# Patient Record
Sex: FEMALE | Race: WHITE | NOT HISPANIC OR LATINO | Employment: PART TIME
[De-identification: names, ages, dates, MRNs, and addresses within clinical notes are randomized per-mention and may not be internally consistent; named-entity substitution may affect disease eponyms.]

---

## 2017-01-11 ENCOUNTER — RECORDS - HEALTHEAST (OUTPATIENT)
Dept: ADMINISTRATIVE | Facility: OTHER | Age: 27
End: 2017-01-11

## 2017-01-11 LAB
HPV_EXT - HISTORICAL: NORMAL
PAP SMEAR - HIM PATIENT REPORTED: NORMAL

## 2018-04-17 ENCOUNTER — PRENATAL OFFICE VISIT - HEALTHEAST (OUTPATIENT)
Dept: MIDWIFE SERVICES | Facility: CLINIC | Age: 28
End: 2018-04-17

## 2018-04-17 DIAGNOSIS — Z98.891 HX OF CESAREAN SECTION: ICD-10-CM

## 2018-04-17 DIAGNOSIS — Z87.09 HISTORY OF ASTHMA: ICD-10-CM

## 2018-04-17 DIAGNOSIS — Z34.80 ENCOUNTER FOR SUPERVISION OF OTHER NORMAL PREGNANCY: ICD-10-CM

## 2018-04-17 LAB
BASOPHILS # BLD AUTO: 0.1 THOU/UL (ref 0–0.2)
BASOPHILS NFR BLD AUTO: 1 % (ref 0–2)
CLUE CELLS: NORMAL
EOSINOPHIL # BLD AUTO: 0.1 THOU/UL (ref 0–0.4)
EOSINOPHIL NFR BLD AUTO: 1 % (ref 0–6)
ERYTHROCYTE [DISTWIDTH] IN BLOOD BY AUTOMATED COUNT: 13.3 % (ref 11–14.5)
HCT VFR BLD AUTO: 37.7 % (ref 35–47)
HGB BLD-MCNC: 11.9 G/DL (ref 12–16)
HIV 1+2 AB+HIV1 P24 AG SERPL QL IA: NEGATIVE
LYMPHOCYTES # BLD AUTO: 2.3 THOU/UL (ref 0.8–4.4)
LYMPHOCYTES NFR BLD AUTO: 25 % (ref 20–40)
MCH RBC QN AUTO: 28.9 PG (ref 27–34)
MCHC RBC AUTO-ENTMCNC: 31.6 G/DL (ref 32–36)
MCV RBC AUTO: 92 FL (ref 80–100)
MONOCYTES # BLD AUTO: 0.7 THOU/UL (ref 0–0.9)
MONOCYTES NFR BLD AUTO: 7 % (ref 2–10)
NEUTROPHILS # BLD AUTO: 6.1 THOU/UL (ref 2–7.7)
NEUTROPHILS NFR BLD AUTO: 66 % (ref 50–70)
PLATELET # BLD AUTO: 374 THOU/UL (ref 140–440)
PMV BLD AUTO: 11.2 FL (ref 8.5–12.5)
RBC # BLD AUTO: 4.12 MILL/UL (ref 3.8–5.4)
TRICHOMONAS, WET PREP: NORMAL
WBC: 9.2 THOU/UL (ref 4–11)
YEAST, WET PREP: NORMAL

## 2018-04-17 ASSESSMENT — MIFFLIN-ST. JEOR: SCORE: 1362.25

## 2018-04-18 LAB
25(OH)D3 SERPL-MCNC: 32.8 NG/ML (ref 30–80)
ABO/RH(D): NORMAL
ABORH REPEAT: NORMAL
ANTIBODY SCREEN: NEGATIVE
BACTERIA SPEC CULT: NO GROWTH
C TRACH DNA SPEC QL PROBE+SIG AMP: NEGATIVE
HBV SURFACE AG SERPL QL IA: NEGATIVE
N GONORRHOEA DNA SPEC QL NAA+PROBE: NEGATIVE
RUBV IGG SERPL QL IA: POSITIVE
T PALLIDUM AB SER QL: NEGATIVE

## 2018-04-19 ENCOUNTER — AMBULATORY - HEALTHEAST (OUTPATIENT)
Dept: MIDWIFE SERVICES | Facility: CLINIC | Age: 28
End: 2018-04-19

## 2018-04-19 ENCOUNTER — COMMUNICATION - HEALTHEAST (OUTPATIENT)
Dept: ADMINISTRATIVE | Facility: CLINIC | Age: 28
End: 2018-04-19

## 2018-04-19 DIAGNOSIS — Z98.891 HX OF CESAREAN SECTION: ICD-10-CM

## 2018-04-19 DIAGNOSIS — Z87.09 HISTORY OF ASTHMA: ICD-10-CM

## 2018-04-20 ENCOUNTER — AMBULATORY - HEALTHEAST (OUTPATIENT)
Dept: MIDWIFE SERVICES | Facility: CLINIC | Age: 28
End: 2018-04-20

## 2018-04-20 ENCOUNTER — HOSPITAL ENCOUNTER (OUTPATIENT)
Dept: ULTRASOUND IMAGING | Facility: CLINIC | Age: 28
Discharge: HOME | End: 2018-04-20
Attending: MIDWIFE

## 2018-04-20 DIAGNOSIS — Z34.80 ENCOUNTER FOR SUPERVISION OF OTHER NORMAL PREGNANCY: ICD-10-CM

## 2018-04-20 DIAGNOSIS — Z87.09 HISTORY OF ASTHMA: ICD-10-CM

## 2018-04-20 DIAGNOSIS — Z98.891 HX OF CESAREAN SECTION: ICD-10-CM

## 2018-05-29 ENCOUNTER — RECORDS - HEALTHEAST (OUTPATIENT)
Dept: ADMINISTRATIVE | Facility: OTHER | Age: 28
End: 2018-05-29

## 2018-05-29 ENCOUNTER — PRENATAL OFFICE VISIT - HEALTHEAST (OUTPATIENT)
Dept: MIDWIFE SERVICES | Facility: CLINIC | Age: 28
End: 2018-05-29

## 2018-05-29 DIAGNOSIS — Z87.09 HISTORY OF ASTHMA: ICD-10-CM

## 2018-05-29 DIAGNOSIS — Z13.79 GENETIC SCREENING: ICD-10-CM

## 2018-05-29 DIAGNOSIS — Z71.1 CONCERN ABOUT INFECTIOUS DISEASE WITHOUT DIAGNOSIS: ICD-10-CM

## 2018-05-29 DIAGNOSIS — Z34.82 ENCOUNTER FOR SUPERVISION OF OTHER NORMAL PREGNANCY IN SECOND TRIMESTER: ICD-10-CM

## 2018-05-29 DIAGNOSIS — Z98.891 HX OF CESAREAN SECTION: ICD-10-CM

## 2018-05-29 ASSESSMENT — MIFFLIN-ST. JEOR: SCORE: 1375.86

## 2018-05-30 ENCOUNTER — COMMUNICATION - HEALTHEAST (OUTPATIENT)
Dept: ADMINISTRATIVE | Facility: CLINIC | Age: 28
End: 2018-05-30

## 2018-05-31 LAB
AFP MOM: 0.96 MOM
ALPHA FETO PROTEIN: 33.8 NG/ML
CALCULATED AGE AT EDD: NORMAL
COLLECTION DATE: NORMAL
CURRENT CIGARETTE SMOKING STATUS: NORMAL
DOWN SYNDROME MATERNAL AGE RISK: NORMAL
DOWN SYNDROME SCREEN RISK ESTIMATE: NORMAL
EDD BY LMP: NORMAL
GA ON COLLECTION BY DATES: NORMAL WK,D
GA USED IN RISK ESTIMATE: NORMAL
GENERAL TEST INFORMATION: NORMAL
HCG, TOTAL MOM: 0.56 MOM
HCG, TOTAL: 19.9 IU/ML
INHIBIN MOM: 0.72 MOM
INHIBIN: 114 PG/ML
INITIAL OR REPEAT TESTING: NORMAL
INSULIN DIABETIC: NO
INTERPRETATION: NORMAL
IVF PREGNANCY: NO
Lab: NORMAL
NEURAL TUBE DEFECT RISK ESTIMATE: NORMAL
NUMBER OF CHORIONS: NORMAL
NUMBER OF FETUSES:: 1
PATIENT OR FATHER OF BABY HAS A NTD: NO
PATIENT WEIGHT: 142 LBS
PHYSICIAN PHONE NUMBER: NORMAL
PREV DOWN (T21) / TRISOMY PREGNANCY: NORMAL
PREV PREGNANCY W/ NEURAL TUBE DEFECT: NO
PT DATE OF BIRTH: NORMAL
RACE OF MOTHER: NORMAL
RECOMMENDED FOLLOW UP: NORMAL
TRISOMY 18 SCREEN RISK ESTIMATE: NORMAL
UE3 MOM: 1.16 MOM
UE3: 0.96 NG/ML

## 2018-06-15 ENCOUNTER — COMMUNICATION - HEALTHEAST (OUTPATIENT)
Dept: ADMINISTRATIVE | Facility: CLINIC | Age: 28
End: 2018-06-15

## 2018-06-26 ENCOUNTER — PRENATAL OFFICE VISIT - HEALTHEAST (OUTPATIENT)
Dept: MIDWIFE SERVICES | Facility: CLINIC | Age: 28
End: 2018-06-26

## 2018-06-26 ENCOUNTER — HOSPITAL ENCOUNTER (OUTPATIENT)
Dept: ULTRASOUND IMAGING | Facility: CLINIC | Age: 28
Discharge: HOME | End: 2018-06-26
Attending: ADVANCED PRACTICE MIDWIFE

## 2018-06-26 DIAGNOSIS — Z34.82 ENCOUNTER FOR SUPERVISION OF OTHER NORMAL PREGNANCY IN SECOND TRIMESTER: ICD-10-CM

## 2018-06-26 DIAGNOSIS — Z13.79 GENETIC SCREENING: ICD-10-CM

## 2018-06-26 DIAGNOSIS — Z98.891 HX OF CESAREAN SECTION: ICD-10-CM

## 2018-06-26 ASSESSMENT — MIFFLIN-ST. JEOR: SCORE: 1403.07

## 2018-07-26 ENCOUNTER — RECORDS - HEALTHEAST (OUTPATIENT)
Dept: ADMINISTRATIVE | Facility: OTHER | Age: 28
End: 2018-07-26

## 2018-07-26 ENCOUNTER — PRENATAL OFFICE VISIT - HEALTHEAST (OUTPATIENT)
Dept: MIDWIFE SERVICES | Facility: CLINIC | Age: 28
End: 2018-07-26

## 2018-07-26 ENCOUNTER — OFFICE VISIT - HEALTHEAST (OUTPATIENT)
Dept: OBGYN | Facility: CLINIC | Age: 28
End: 2018-07-26

## 2018-07-26 DIAGNOSIS — Z98.891 HX OF CESAREAN SECTION: ICD-10-CM

## 2018-07-26 DIAGNOSIS — Z34.82 ENCOUNTER FOR SUPERVISION OF OTHER NORMAL PREGNANCY IN SECOND TRIMESTER: ICD-10-CM

## 2018-07-26 DIAGNOSIS — Z30.09 STERILIZATION CONSULT: ICD-10-CM

## 2018-07-26 DIAGNOSIS — O34.219 H/O CESAREAN SECTION COMPLICATING PREGNANCY: ICD-10-CM

## 2018-07-26 ASSESSMENT — MIFFLIN-ST. JEOR
SCORE: 1434.83
SCORE: 1434.83

## 2018-08-21 ENCOUNTER — PRENATAL OFFICE VISIT - HEALTHEAST (OUTPATIENT)
Dept: MIDWIFE SERVICES | Facility: CLINIC | Age: 28
End: 2018-08-21

## 2018-08-21 DIAGNOSIS — O26.899 PELVIC PAIN AFFECTING PREGNANCY: ICD-10-CM

## 2018-08-21 DIAGNOSIS — R10.2 PELVIC PAIN AFFECTING PREGNANCY: ICD-10-CM

## 2018-08-21 DIAGNOSIS — Z98.891 HX OF CESAREAN SECTION: ICD-10-CM

## 2018-08-21 DIAGNOSIS — Z34.80 ENCOUNTER FOR SUPERVISION OF OTHER NORMAL PREGNANCY: ICD-10-CM

## 2018-08-21 LAB
FASTING STATUS PATIENT QL REPORTED: NO
FIBRONECTIN FETAL VAG QL: NEGATIVE
GLUCOSE 1H P 50 G GLC PO SERPL-MCNC: 95 MG/DL (ref 70–139)
HGB BLD-MCNC: 10 G/DL (ref 12–16)

## 2018-08-21 ASSESSMENT — MIFFLIN-ST. JEOR: SCORE: 1448.43

## 2018-09-19 ENCOUNTER — PRENATAL OFFICE VISIT - HEALTHEAST (OUTPATIENT)
Dept: MIDWIFE SERVICES | Facility: CLINIC | Age: 28
End: 2018-09-19

## 2018-09-19 DIAGNOSIS — O09.93 SUPERVISION OF HIGH RISK PREGNANCY IN THIRD TRIMESTER: ICD-10-CM

## 2018-09-19 DIAGNOSIS — O26.899 ABDOMINAL CRAMPING AFFECTING PREGNANCY: ICD-10-CM

## 2018-09-19 DIAGNOSIS — Z98.891 HX OF CESAREAN SECTION: ICD-10-CM

## 2018-09-19 DIAGNOSIS — Z71.1 CONCERN ABOUT INFECTIOUS DISEASE WITHOUT DIAGNOSIS: ICD-10-CM

## 2018-09-19 DIAGNOSIS — B37.31 YEAST VAGINITIS: ICD-10-CM

## 2018-09-19 DIAGNOSIS — R10.9 ABDOMINAL CRAMPING AFFECTING PREGNANCY: ICD-10-CM

## 2018-09-19 DIAGNOSIS — Z13.79 GENETIC SCREENING: ICD-10-CM

## 2018-09-19 LAB
CLUE CELLS: ABNORMAL
TRICHOMONAS, WET PREP: ABNORMAL
YEAST, WET PREP: ABNORMAL

## 2018-09-19 ASSESSMENT — MIFFLIN-ST. JEOR: SCORE: 1466.58

## 2018-10-03 ENCOUNTER — PRENATAL OFFICE VISIT - HEALTHEAST (OUTPATIENT)
Dept: MIDWIFE SERVICES | Facility: CLINIC | Age: 28
End: 2018-10-03

## 2018-10-03 DIAGNOSIS — O09.93 SUPERVISION OF HIGH RISK PREGNANCY IN THIRD TRIMESTER: ICD-10-CM

## 2018-10-03 LAB — RUPTURE OF FETAL MEMBRANES BY ROM PLUS: NEGATIVE

## 2018-10-03 ASSESSMENT — MIFFLIN-ST. JEOR: SCORE: 1471.11

## 2018-10-12 ENCOUNTER — COMMUNICATION - HEALTHEAST (OUTPATIENT)
Dept: OBGYN | Facility: CLINIC | Age: 28
End: 2018-10-12

## 2018-10-17 ENCOUNTER — PRENATAL OFFICE VISIT - HEALTHEAST (OUTPATIENT)
Dept: MIDWIFE SERVICES | Facility: CLINIC | Age: 28
End: 2018-10-17

## 2018-10-17 DIAGNOSIS — Z98.891 HX OF CESAREAN SECTION: ICD-10-CM

## 2018-10-17 DIAGNOSIS — O09.93 SUPERVISION OF HIGH RISK PREGNANCY IN THIRD TRIMESTER: ICD-10-CM

## 2018-10-17 LAB — HGB BLD-MCNC: 11.2 G/DL (ref 12–16)

## 2018-10-17 ASSESSMENT — MIFFLIN-ST. JEOR: SCORE: 1489.26

## 2018-10-18 ENCOUNTER — OFFICE VISIT - HEALTHEAST (OUTPATIENT)
Dept: OBGYN | Facility: CLINIC | Age: 28
End: 2018-10-18

## 2018-10-18 DIAGNOSIS — O34.219 H/O CESAREAN SECTION COMPLICATING PREGNANCY: ICD-10-CM

## 2018-10-18 LAB
ALLERGIC TO PENICILLIN: NO
GP B STREP DNA SPEC QL NAA+PROBE: NEGATIVE

## 2018-10-18 ASSESSMENT — MIFFLIN-ST. JEOR: SCORE: 1489.26

## 2018-10-24 ENCOUNTER — PRENATAL OFFICE VISIT - HEALTHEAST (OUTPATIENT)
Dept: MIDWIFE SERVICES | Facility: CLINIC | Age: 28
End: 2018-10-24

## 2018-10-24 DIAGNOSIS — O09.93 SUPERVISION OF HIGH RISK PREGNANCY IN THIRD TRIMESTER: ICD-10-CM

## 2018-10-24 DIAGNOSIS — Z98.891 HX OF CESAREAN SECTION: ICD-10-CM

## 2018-10-24 ASSESSMENT — MIFFLIN-ST. JEOR: SCORE: 1484.72

## 2018-10-29 ENCOUNTER — RECORDS - HEALTHEAST (OUTPATIENT)
Dept: ADMINISTRATIVE | Facility: OTHER | Age: 28
End: 2018-10-29

## 2018-10-31 ENCOUNTER — PRENATAL OFFICE VISIT - HEALTHEAST (OUTPATIENT)
Dept: MIDWIFE SERVICES | Facility: CLINIC | Age: 28
End: 2018-10-31

## 2018-10-31 DIAGNOSIS — O09.93 SUPERVISION OF HIGH RISK PREGNANCY IN THIRD TRIMESTER: ICD-10-CM

## 2018-10-31 DIAGNOSIS — Z98.891 HX OF CESAREAN SECTION: ICD-10-CM

## 2018-10-31 ASSESSMENT — MIFFLIN-ST. JEOR: SCORE: 1498.33

## 2018-11-07 ENCOUNTER — ANESTHESIA - HEALTHEAST (OUTPATIENT)
Dept: OBGYN | Facility: HOSPITAL | Age: 28
End: 2018-11-07

## 2018-11-07 ENCOUNTER — SURGERY - HEALTHEAST (OUTPATIENT)
Dept: OBGYN | Facility: HOSPITAL | Age: 28
End: 2018-11-07

## 2018-11-07 ASSESSMENT — MIFFLIN-ST. JEOR: SCORE: 1502.86

## 2018-11-09 ENCOUNTER — HOME CARE/HOSPICE - HEALTHEAST (OUTPATIENT)
Dept: HOME HEALTH SERVICES | Facility: HOME HEALTH | Age: 28
End: 2018-11-09

## 2018-11-11 ENCOUNTER — HOME CARE/HOSPICE - HEALTHEAST (OUTPATIENT)
Dept: HOME HEALTH SERVICES | Facility: HOME HEALTH | Age: 28
End: 2018-11-11

## 2018-11-15 ENCOUNTER — RECORDS - HEALTHEAST (OUTPATIENT)
Dept: ADMINISTRATIVE | Facility: OTHER | Age: 28
End: 2018-11-15

## 2018-11-20 ENCOUNTER — OFFICE VISIT - HEALTHEAST (OUTPATIENT)
Dept: OBGYN | Facility: CLINIC | Age: 28
End: 2018-11-20

## 2018-11-20 DIAGNOSIS — Z98.890 POST-OPERATIVE STATE: ICD-10-CM

## 2018-11-20 ASSESSMENT — MIFFLIN-ST. JEOR: SCORE: 1434.83

## 2018-12-19 ENCOUNTER — OFFICE VISIT - HEALTHEAST (OUTPATIENT)
Dept: MIDWIFE SERVICES | Facility: CLINIC | Age: 28
End: 2018-12-19

## 2018-12-19 DIAGNOSIS — Z30.09 ENCOUNTER FOR OTHER GENERAL COUNSELING OR ADVICE ON CONTRACEPTION: ICD-10-CM

## 2018-12-19 DIAGNOSIS — Z91.89 BREASTFEEDING PROBLEM: ICD-10-CM

## 2018-12-19 ASSESSMENT — MIFFLIN-ST. JEOR: SCORE: 1430.29

## 2019-01-08 ENCOUNTER — COMMUNICATION - HEALTHEAST (OUTPATIENT)
Dept: MIDWIFE SERVICES | Facility: CLINIC | Age: 29
End: 2019-01-08

## 2019-01-16 ENCOUNTER — OFFICE VISIT - HEALTHEAST (OUTPATIENT)
Dept: MIDWIFE SERVICES | Facility: CLINIC | Age: 29
End: 2019-01-16

## 2019-01-16 DIAGNOSIS — Z30.430 ENCOUNTER FOR INSERTION OF MIRENA IUD: ICD-10-CM

## 2019-01-16 DIAGNOSIS — Z01.812 PRE-PROCEDURE LAB EXAM: ICD-10-CM

## 2019-01-16 LAB
HCG UR QL: NEGATIVE
SP GR UR STRIP: <=1.005 (ref 1–1.03)

## 2019-01-16 ASSESSMENT — MIFFLIN-ST. JEOR: SCORE: 1412.15

## 2019-01-17 LAB
C TRACH DNA SPEC QL PROBE+SIG AMP: NEGATIVE
N GONORRHOEA DNA SPEC QL NAA+PROBE: NEGATIVE

## 2019-02-06 ENCOUNTER — COMMUNICATION - HEALTHEAST (OUTPATIENT)
Dept: MIDWIFE SERVICES | Facility: CLINIC | Age: 29
End: 2019-02-06

## 2019-02-20 ENCOUNTER — COMMUNICATION - HEALTHEAST (OUTPATIENT)
Dept: ADMINISTRATIVE | Facility: CLINIC | Age: 29
End: 2019-02-20

## 2019-02-25 ENCOUNTER — RECORDS - HEALTHEAST (OUTPATIENT)
Dept: HEALTH INFORMATION MANAGEMENT | Facility: CLINIC | Age: 29
End: 2019-02-25

## 2019-02-27 ENCOUNTER — HOSPITAL ENCOUNTER (OUTPATIENT)
Dept: ULTRASOUND IMAGING | Facility: CLINIC | Age: 29
Discharge: HOME | End: 2019-02-27
Attending: ADVANCED PRACTICE MIDWIFE

## 2019-02-27 ENCOUNTER — OFFICE VISIT - HEALTHEAST (OUTPATIENT)
Dept: MIDWIFE SERVICES | Facility: CLINIC | Age: 29
End: 2019-02-27

## 2019-02-27 DIAGNOSIS — Z30.431 IUD CHECK UP: ICD-10-CM

## 2019-02-27 DIAGNOSIS — T83.32XA INTRAUTERINE CONTRACEPTIVE DEVICE THREADS LOST, INITIAL ENCOUNTER: ICD-10-CM

## 2019-02-27 DIAGNOSIS — N93.9 ABNORMAL UTERINE BLEEDING: ICD-10-CM

## 2019-02-27 DIAGNOSIS — Z30.431 ENCOUNTER FOR ROUTINE CHECKING OF INTRAUTERINE CONTRACEPTIVE DEVICE (IUD): ICD-10-CM

## 2019-02-27 ASSESSMENT — MIFFLIN-ST. JEOR: SCORE: 1394

## 2019-02-28 ENCOUNTER — OFFICE VISIT - HEALTHEAST (OUTPATIENT)
Dept: MIDWIFE SERVICES | Facility: CLINIC | Age: 29
End: 2019-02-28

## 2019-02-28 DIAGNOSIS — Z30.432 ENCOUNTER FOR IUD REMOVAL: ICD-10-CM

## 2019-02-28 ASSESSMENT — MIFFLIN-ST. JEOR: SCORE: 1394

## 2019-09-08 ENCOUNTER — COMMUNICATION - HEALTHEAST (OUTPATIENT)
Dept: MIDWIFE SERVICES | Facility: CLINIC | Age: 29
End: 2019-09-08

## 2019-09-11 ENCOUNTER — OFFICE VISIT - HEALTHEAST (OUTPATIENT)
Dept: MIDWIFE SERVICES | Facility: CLINIC | Age: 29
End: 2019-09-11

## 2019-09-11 ASSESSMENT — MIFFLIN-ST. JEOR: SCORE: 1339.57

## 2020-03-09 ENCOUNTER — COMMUNICATION - HEALTHEAST (OUTPATIENT)
Dept: MIDWIFE SERVICES | Facility: CLINIC | Age: 30
End: 2020-03-09

## 2020-03-09 DIAGNOSIS — Z78.9 USES BIRTH CONTROL: ICD-10-CM

## 2021-02-17 ENCOUNTER — OFFICE VISIT - HEALTHEAST (OUTPATIENT)
Dept: MIDWIFE SERVICES | Facility: CLINIC | Age: 31
End: 2021-02-17

## 2021-02-17 DIAGNOSIS — Z78.9 USES BIRTH CONTROL: ICD-10-CM

## 2021-03-08 ENCOUNTER — RECORDS - HEALTHEAST (OUTPATIENT)
Dept: ADMINISTRATIVE | Facility: OTHER | Age: 31
End: 2021-03-08

## 2021-06-01 VITALS — WEIGHT: 148 LBS | HEIGHT: 66 IN | BODY MASS INDEX: 23.78 KG/M2

## 2021-06-01 VITALS — BODY MASS INDEX: 22.34 KG/M2 | HEIGHT: 66 IN | WEIGHT: 139 LBS

## 2021-06-01 VITALS — HEIGHT: 66 IN | BODY MASS INDEX: 24.91 KG/M2 | WEIGHT: 155 LBS

## 2021-06-01 VITALS — BODY MASS INDEX: 25.39 KG/M2 | HEIGHT: 66 IN | WEIGHT: 158 LBS

## 2021-06-01 VITALS — BODY MASS INDEX: 24.91 KG/M2 | HEIGHT: 66 IN | WEIGHT: 155 LBS

## 2021-06-01 VITALS — HEIGHT: 66 IN | WEIGHT: 142 LBS | BODY MASS INDEX: 22.82 KG/M2

## 2021-06-01 NOTE — PROGRESS NOTES
Assessment:   1.  Contraception    Plan:     -After discussion of methods, would like to use NuvaRing.   -Written and verbal information given on this method. ACHES (NuvaRing), warning signs reviewed.   -Labs: None today  -Rx written for NuvaRing as directed, #3, 4 refills.   -Return in three months for reevaluation/blood pressure check.  -Due 2020 for annual well woman exam/Pap smear.    TT with patient 25 mns, >50% time spent in counseling or coordination of care.     Subjective:     Johanna Tatum is a 29 y.o. female who presents for birth control consultation. Current contraception method: condoms.Sexually transmitted infection risk: very low risk of STD exposure. LMP 2019 (today). Menstrual flow: regular every 28-30 days. Has used NuvaRing, Mirena IUD and combined oral contraceptive pills for contraception in the past. Pertinent past medical history: Headaches weekly menstrual cycle related, not migraine with aura.    Menstrual History:  OB History        2    Para   2    Term   2            AB        Living   2       SAB        TAB        Ectopic        Multiple   0    Live Births   2                Patient's last menstrual period was 2019.       The following portions of the patient's history were reviewed and updated as appropriate: allergies, current medications, past family history, past medical history, past social history, past surgical history and problem list.    Review of Systems  Pertinent items are noted in HPI.      Objective:   No exam.      No exam performed today, Not necessary.

## 2021-06-02 VITALS — BODY MASS INDEX: 24.11 KG/M2 | WEIGHT: 150 LBS | HEIGHT: 66 IN

## 2021-06-02 VITALS — WEIGHT: 146 LBS | HEIGHT: 66 IN | BODY MASS INDEX: 23.46 KG/M2

## 2021-06-02 VITALS — HEIGHT: 66 IN | WEIGHT: 163 LBS | BODY MASS INDEX: 26.2 KG/M2

## 2021-06-02 VITALS — BODY MASS INDEX: 26.03 KG/M2 | WEIGHT: 162 LBS | HEIGHT: 66 IN

## 2021-06-02 VITALS — BODY MASS INDEX: 24.91 KG/M2 | HEIGHT: 66 IN | WEIGHT: 155 LBS

## 2021-06-02 VITALS — HEIGHT: 66 IN | BODY MASS INDEX: 26.84 KG/M2 | WEIGHT: 167 LBS

## 2021-06-02 VITALS — WEIGHT: 167 LBS | BODY MASS INDEX: 26.84 KG/M2 | HEIGHT: 66 IN

## 2021-06-02 VITALS — HEIGHT: 66 IN | BODY MASS INDEX: 24.75 KG/M2 | WEIGHT: 154 LBS

## 2021-06-02 VITALS — BODY MASS INDEX: 23.46 KG/M2 | WEIGHT: 146 LBS | HEIGHT: 66 IN

## 2021-06-02 VITALS — WEIGHT: 169 LBS | HEIGHT: 66 IN | BODY MASS INDEX: 27.16 KG/M2

## 2021-06-02 VITALS — HEIGHT: 66 IN | WEIGHT: 170 LBS | BODY MASS INDEX: 27.32 KG/M2

## 2021-06-02 VITALS — HEIGHT: 66 IN | WEIGHT: 166 LBS | BODY MASS INDEX: 26.68 KG/M2

## 2021-06-03 VITALS
HEIGHT: 66 IN | DIASTOLIC BLOOD PRESSURE: 60 MMHG | HEART RATE: 68 BPM | BODY MASS INDEX: 21.53 KG/M2 | SYSTOLIC BLOOD PRESSURE: 100 MMHG | WEIGHT: 134 LBS

## 2021-06-06 NOTE — TELEPHONE ENCOUNTER
We received fax for birth control refill. Per note from [facility] patient was to have a med check visit after 3 months and of note was also due for PHX 2/2020. I called patient and she did schedule for her PHX 3/25 and asks for a 3 month supply to be sent in to cover her though the visit Patient uses mail order pharmacy. RX prepped.

## 2021-06-15 NOTE — PROGRESS NOTES
"Johanna Tatum is a 31 y.o. female who is being evaluated via a billable telephone visit.      The patient has been notified of following:     \"This telephone visit will be conducted via a call between you and your physician/provider. We have found that certain health care needs can be provided without the need for a physical exam.  This service lets us provide the care you need with a short phone conversation.  If a prescription is necessary we can send it directly to your pharmacy.  If lab work is needed we can place an order for that and you can then stop by our lab to have the test done at a later time.    Telephone visits are billed at different rates depending on your insurance coverage. During this emergency period, for some insurers they may be billed the same as an in-person visit.  Please reach out to your insurance provider with any questions.    If during the course of the call the physician/provider feels a telephone visit is not appropriate, you will not be charged for this service.\"    Patient has given verbal consent to a Telephone visit? Yes    What phone number would you like to be contacted at? [phone]    Patient would like to receive their AVS by AVS Preference: Meenu.    Additional provider notes:     S: Johanna is excepting of a telephone visit today in order to discuss family planning/contraception.  She was using NuvaRing after her son Dean was born but noticed decreased libido.  She discontinued NuvaRing August 2020.  After doing so, she did appreciate a rise in libido but noticed menstrual cycle was heavier, longer and accompanied by painful cramping.  \"It reminded me of going into labor!\"  Today she voices interest in combined oral contraceptive pills.  A seasonal menstrual period is agreeable to her.  She denies contraindications to estrogen or progesterone.    O: Alert and in no apparent distress.    A: 1.  Contraceptive management  2.  Healthcare maintenance    P: Prescription " for seasonal birth control pill sent to preferred pharmacy.  May begin pill pack now while on her menstrual period and will be effective immediately.  If started after menstrual period ceases, condom use for 7 days recommended.  ACHES danger signs and symptoms reviewed.  RTC in 8 to 12 weeks for BP check, annual well woman exam which will include cervical cancer screening/Pap smear with HPV.  All questions answered.  Encouraged to call or return to clinic with any questions, concerns, or as needed.      Phone call duration: 19 minutes

## 2021-06-16 PROBLEM — Z87.09 HISTORY OF ASTHMA: Status: ACTIVE | Noted: 2018-04-17

## 2021-06-16 PROBLEM — Z78.9 USES BIRTH CONTROL: Status: ACTIVE | Noted: 2018-12-19

## 2021-06-17 NOTE — PROGRESS NOTES
PHONE NOTE:    Patient notified via phone call of normal dating ultrasound.    MARCO Salomon, TEJALM

## 2021-06-17 NOTE — PROGRESS NOTES
"PRENATAL VISIT   FIRST OBSTETRICAL EXAM - OB   Assessment / Impression   First prenatal visit at Unknown      Hx of previous C-Birth and pt wants a repeat C-sec and PNC with CNMs  LMP is probably 18  DOC: 18 (sure)  Pos UPT 3/6/18  10w 0d and EDC 18   Plan:     -IOB labs drawn.   -Pt is not interested in drawing lead level--not indicated   -Patient is not interested in waterbirth--contraindicated  -Reviewed prenatal care schedule.   -Optimal nutrition and weight gain discussed. Pregnancy weight gain of 25-35 lbs (BMI 18.5-24.9) encouraged.   -Anticipatory guidance for common pregnancy questions and concerns reviewed.   -Danger s/sx for this trimester reviewed with patient.   -Reviewed genetic screening options with patient, patient does not elect for first trimester screening. The patient does not elect for quad screening.   -Reviewed carrier testing options with patient, patient does not elect for testing or referral to genetic counseling. Innatal info given to pt for her review.  -IOB packet given and reviewed with patient.   -CNM services and hospital options reviewed; emergency and scheduling phone numbers given to patient.   - Referred pt to [employer] website for Zika for the most current/up to date info so she can make her decision regarding travel to [facility].   -Return to clinic 4 wks  Total time spent with patient: 40 minutes, >50% time spent counseling and coordinating care.   Subjective:   Johanna Tatum is a 28 y.o.  here today for her first obstetrical exam at 10. Here by herself. This pregnancy is planned Attempting pregnancy for 3 months. The patient reports nausea, but no vomiting, fatigue and some mild breast tenderness. She has a uncertain LMP on 18 probably, predicting an expected date of delivery of Estimated Date of Delivery: 18 Last period was normal. Her pregnancy is dated by DOC on 18. \"Only possible date of conception\".   The patient states that she " "is in a monogamous relationship and states that she is safe,  for 6 yrs. Offered GC/CT screening today and patient accepts.   (Pt says she had \"sneak peak\" test done at 9 wks for $150 and they said it is a BOY. No other info regarding chromosomes given. Unsure how accurate.)  Current symptoms also include: breast tenderness, fatigue, frequent urination and morning sickness.   General health/lifestyle:  Patient states that she usually wears a seatbelt, there are no firearms in the home, there is no smoking in the home.  In a typical week patient works out 5-6 days with yoga, strength training, running.  In a typical week patient has 0 alcoholic drinks.    No abuse issues  Patient had a birth on 13 and this was a  section.  Patient is currently pregnant.  Sexual health history/family planning:  Patient is currently pregnant.  She has had no issues with STI's.  She has been  for 6 years in a safe monogamous relationship.  Menstrual history: Patient states that her periods were coming every 25 days lasting 7 days and periods were heavy and crampy.      Risk factors: previous . Pregnancy Risk Factors: None   Social History:   Education level: some college (actually a lot of college, physical fitness).   Occupation:    Partners name: Polo Harmon from 2018 - 2018--I recommended no travel to [place] due to Zika virus and precautions for infant microcephaly.  I directed the patient to the CDC Zika website for the most up-to-date/current information on Zika recommendations in that area.  ?   OB History    Para Term  AB Living   1        SAB TAB Ectopic Multiple Live Births             # Outcome Date GA Lbr Yoav/2nd Weight Sex Delivery Anes PTL Lv   1 Current                  History:   Past Medical History:   Diagnosis Date     Abnormal Pap smear of cervix 2010    colpo 2011  normal     Asthma     sports induced, used albuterol     History of " "colposcopy     normal     Seasonal allergies     hayfever      Past Surgical History:   Procedure Laterality Date     APPENDECTOMY  2002      SECTION, LOW TRANSVERSE  2013    Failure to progress in labor     WISDOM TOOTH EXTRACTION  2008      Family History   Problem Relation Age of Onset     Cerebral aneurysm Paternal Grandfather      Prostate cancer Paternal Grandfather       Social History   Substance Use Topics     Smoking status: Never Smoker     Smokeless tobacco: Never Used     Alcohol use No      Comment: none with pregnancy      Current Outpatient Prescriptions   Medication Sig Dispense Refill     folic acid (FOLVITE) 800 MCG tablet Take 800 mcg by mouth daily.       No current facility-administered medications for this visit.       Allergies   Allergen Reactions     Prednisone Rash      The patient's medical, surgical and family histories were reviewed and were not pertinent to this visit.   Pap smear: Last Pap: 17, Result: neg and neg HPV, Previous History: normal, Any history of abnormal: 6 yrs? colpo normal. Next Due: .       EPDS score today:  (situation \"from the weather\")  Review of Systems   General: Fatigue but otherwise denies problem   Eyes: Denies problem   Ears/Nose/Throat: Denies problem   Cardiovascular: Denies problem   Respiratory: Denies problem   Gastrointestinal: Nausea without vomiting, otherwise negative   Genitourinary: Denies any discharge, vaginal bleeding or itchiness or any other problem   Musculoskeletal: Breast tenderness otherwise denies problem   Skin: Denies problem   Neurologic: Denies problem   Psychiatric: Denies problem   Endocrine: Denies problem   Heme/Lymphatic: Denies problem   Allergic/Immunologic: Denies problem       Objective:   Objective    Vitals:    18 1057   BP: 110/70   Pulse: 80   Weight: 139 lb (63 kg)   Height: 5' 6\" (1.676 m)      Physical Exam:   General Appearance: Alert, cooperative, no distress, appears stated age "   HEENT: Normocephalic, without obvious abnormality, atraumatic. Conjunctiva/corneas clear, does  wear corrective lenses   Neck: Supple, symmetrical, trachea midline, no adenopathy.   Thyroid: not enlarged, symmetric, no tenderness/mass/nodules   Back: Symmetric, no curvature, ROM normal, no CVA tenderness   Lungs: Clear to auscultation bilaterally, respirations unlabored   Heart: Regular rate and rhythm, S1 and S2 normal, no murmur, rub, or gallop. No edema to lower extremities.   Breasts: No breast masses, tenderness, asymmetry, or nipple discharge. Nipples are  everted.   Abdomen: Gravid, soft, non-tender, bowel sounds active all four quadrants, no masses.   FHT: 160   Vulva:  no sign of lesions or condyloma, normal hair distribution   Vagina: pink, normal rugae, no abnormal discharge   Cervix:  long/thick/closed, no lesions or inflammation noted, negative CMT with exam   Uterus: mid position, non tender, enlarged approximately 10 weeks size   Adnexa:  no masses appreciated, non-tender with palpation   Pelvic spines:AVERAGE   Sacrum:CONCAVE   Suprapubic Arch:NORMAL   Pelvis type:gynecoid   Musculoskeletal: Extremities normal, atraumatic, no cyanosis   Skin: Skin color, texture, turgor normal, no rashes or lesions   Lymph nodes: Cervical, supraclavicular, and axillary nodes normal   Neurologic: Alert and oriented x 3. Normal speech   Lab:   Results for orders placed or performed in visit on 08/16/13   Wet Prep, Vaginal   Result Value Ref Range    Bacteria, Wet Prep Moderate (!)     Clue Cells, Wet Prep No clue cells seen (No Clue Cells)    WBC, Wet Prep Few (!)     Yeast Result Yeast seen (!) (No Yeast)    Trichomonas No Trichomonas seen (No Trichomonas)

## 2021-06-18 NOTE — PROGRESS NOTES
Johanna Tatum is here for her routine 20w0d routine ob appointment. She does not have any concerns. She stated baby is very active an denies any  ctx. Went over  labor signs and symptoms & when to call. She states she has started to get heartburn which she also had when pregnant with her daughter Helen. She wants to avoid taking medication for this. Discussed natural remedies of avoiding laying down after meals, avoiding acidic foods; coffee, chocolate, etc. Discussed with patient the option of Tums. She had her fetal survey this morning and went over normal results. She is planning on a repeat  Section, referral sent to Dr. Solorio, patient plans on scheduling appointment to see Dr. Solorio. 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 They are planning on using Dr. Gardner at [facility] for a pediatrician. She states she is going to get a breast pump and used an hortencia on her phone for this and states she cannot get her breast pump until October. Weight gain WNL. FHT & fundal height WNL. Patient plans to follow-up in 4 weeks for routine ob appointment.    Juana MONDRAGON student  I was present for and agree with all aspects of physical exam, counseling, plan and decision making.  Leydi LARA CNM

## 2021-06-18 NOTE — PROGRESS NOTES
Johanna Tatum is here with her daughter Helen for her 16w0d appointment. Her main concern for this visit is possible exposure to Zika virus. She and her  went to [place] from 2018-2018. Then went to [facility] from 2018-[facility]. Patient states both her and her  had several bug bites, but neither had symptoms of illness from the bites. They have been using protection (condoms) since getting back from their trip as a precaution.  with SVEN BOYLE consulted on what recommendations were. Per  serum & urine testing recommended for Zika one time in pregnancy as long as results are normal &  continual use of protection/condoms for duration of pregnancy. Labs collected and patient educated on continual use of protection. Patient states she is feeling much better in the second trimester, she now has her appetite back and is no longer nauseous. She still has fatigue, but feels she is sleeping well. Discussed that her weight gain is slightly lower than normal, but likely due to her decreased appetite in early pregnancy and she is gaining weight now. Encourage healthy diet & snacks in between meals. She feels that she has felt baby move a few times. Went over QUAD screening and it's purpose, patient elects to have QUAD screen done today and is discussing whether she will elect for progenity screening if insurance cover this or not. Discussed 20 week fetal survey ultrasound. Patient requests US at [facility], discussed importance of doing this ultrasound at 20 weeks and no earlier, recommended doing ultrasound before 20 week appointment so CNM can discuss results with her at her appointment. She will call and set up appointment for ultrasound. Patient is planning on having a repeat  section. Discussed that at 20-24 weeks we would refer her to Dr. Solorio for consultation for scheduled repeat  section. Patient states that with her previous pregnancy  she dilated to 4 cm early and was put on bed rest at 31 weeks, but then went overdue with [place]. Fundal height & FHT WNL. She states sometimes at night she has cramping, denies leaking of fluid or bleeding. She believes she is hydrated and she drinks lots of water throughout the day and keeps water on her bedside. She states when she wakes cramping is gone. Encouraged patient to call O/C CNM if they become consistent, notices any bleeding or leaking of fluid. Patient denies signs symptoms of UTI or abnormal discharge. Patient plans to return to care in 4 weeks for routine ob appointment. Patient does not have My Chart & needs lab results called to her.    I was present for the evaluation, assessment and plan of care with Juana MONDRAGON student  -MARCO Gurrola, ANN

## 2021-06-19 NOTE — PROGRESS NOTES
CC: The patient is being seen as a consult from Leydi Yarbrough CNM, secondary to a prior  section.    HPI: The pt is a 28 y.o. MWF  at 24.2 weeks gestation who presents with a history of a  section.  She had her  secondary to arrest of descent after 2 hours of pushing with no progress.  Per the records, it was felt that her pelvis was small.  She had her  section on 13.  It was a low transverse incision closed in 2 layers.  With this pregnancy she would like to have another  section.  She is thinking she may want 2 children total.      Past Medical History:   Diagnosis Date     Asthma     sports induced, used albuterol     Seasonal allergies     hayfever       Past Surgical History:   Procedure Laterality Date     APPENDECTOMY        SECTION, LOW TRANSVERSE  2013    arrest of descent     WISDOM TOOTH EXTRACTION         Patient's   Family History   Problem Relation Age of Onset     Cerebral aneurysm Paternal Grandfather      Depression Paternal Grandfather      Mental illness Paternal Grandfather      Drug abuse Mother      In recovery now for 18 years     Depression Father      Alcohol abuse Father      Drug abuse Father      Mental illness Father      No Medical Problems Sister      No Medical Problems Brother      No Medical Problems Daughter      Cancer Maternal Grandmother      Lymphoma     Dementia Maternal Grandmother      Hearing loss Maternal Grandmother      Prostate cancer Maternal Grandfather      No Medical Problems Paternal Grandmother        Patient   Social History     Social History     Marital status:      Spouse name: Polo     Number of children: 1     Years of education: college grad     Occupational History     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       multivitamin therapeutic tablet        No current facility-administered medications for this visit.        Patient is allergic to prednisone.    ROS:  12 part ROS is negative aside from those symptoms in the HPI    PE:  /70  Pulse 88  Ht 5' 6\" (1.676 m)  Wt 155 lb (70.3 kg)  LMP 2018 (Approximate)  BMI 25.02 kg/m2          Body mass index is 25.02 kg/(m^2).    General: WN/WD WF, NAD  Abdomen: gravid  Psych: normal mood  Neuro: CN I-XII grossly intact  MS: normal gait    Assessment: 28 y.o. MWF  at 24.2 weeks gestation with a prior  section.    Plan: Both vaginal delivery and  delivery risks and benefits were discussed with her.  We discussed what happened the last time and how it can impact this delivery.  We discussed that since the baby was OP last time, if this baby were in a better position, she might be able to deliver vaginally, but that is an unknown until the time of labor.  We discussed the approximately 1% risk with either method of delivery, just different risks with each.  We also discussed that the risk involved with a  section is higher for someone who has labored first.  We discussed the recovery with vaginal delivery is usually easier than with  section and usually has a shorter stay in the hospital.  We also discussed the risks of surgery, especially as the number of surgeries increases.  We discussed the hospital stay and recovery limitations, especially with lifting and driving.  We discussed the slightly increased risk of transient tachypnea of the  with  compared to vaginal delivery.  We discussed the timing of scheduled cesareans being no earlier than 39 weeks gestation unless there is " some other medical issue that would make delivery earlier necessary.  Since she knows she doesn't want any more children we also discussed tubal ligation at the time of the .  She was counseled on the permanence of the procedure, the approximately 1/200 failure rate and the increased risk for ectopic should pregnancy occur.  She will talk with her  and decide what she wants to do.  I counseled her that I would like to see her around 36 or 37 weeks gestation to answer any last questions and go through the process of a scheduled delivery.    Approximately 30 minutes were spent with the patient with the majority in counseling.

## 2021-06-19 NOTE — PROGRESS NOTES
Johanna is here alone today.  She is feeling well.  She will have her consult with Dr. Solorio after this visit.  She is desiring a repeat  section.  She notes that she gets Alden Godwin contractions frequently, but not more than 6 an hour.  Reviewed signs and symptoms of  labor and when to call.  She does note that she was 4 cm by the time she was 30 or 31 weeks with her last pregnancy and was not experiencing regular contractions.  She did not birth, however until 41-1/2 weeks.  She is planning to breast-feed again, however she has had breast augmentation so is unsure how this will go.  Notified her that we currently have 3 midwives who are IBCLC lactation consultants and that she could also use the resources of the lactation consultants on each of the units.  She wants to be more proactive about asking for help this time.  She is feeling active fetal movement.  Will plan diabetes screen and hemoglobin at next visit.  Declines RPR due to self-pay insurance.

## 2021-06-20 NOTE — PROGRESS NOTES
Johanna is here with her daughter for her routine PNV 28 wks. 1 hr glucose and hgb today. RPR declined today and PPday 1.  fFN today and cx check d/t some increased UC's and pelvic discomfort x 4 hrs yesterday. Encouraged pt to call if this re-occurs and gave numbers again.  fFN negative today. Hemoglobin low (given my chart message). 1 hr glucose WNL.  [facility] and PTL disc and written materials given.  Declines Tdap.

## 2021-06-20 NOTE — PROGRESS NOTES
"Johanna presents to the clinic alone.  32-week pregnancy checklist completed.  Postpartum birth control method: Condoms versus IUD?  Declines permanent sterilization along with planned repeat  birth on 2018 at 7:30 AM Dr. Day Sol[facility] at [facility].  Plans circumcision.  Baby is active, and she denies regular uterine contractions or vaginal bleeding.  Partially completed clotrimazole 1% vaginal cream ~4 nights.  During fundal height measurement, patient states, \"I feel more wet down there for 3 days.\"  States that it does not leak through her garments, but in general feels more damp.  Denies fever, chills, abdominal pain, vaginal discharge changes such as vaginal irritation/vaginal pruritus/vaginal malodor.  Sterile speculum exam reveals homogenous white discharge at the posterior fornix of the vagina, and the cervix appears closed.  ROM plus test obtained and sent for processing.  This writer has a very LOW index of suspicion for PPROM.   labor precautions and danger signs and symptoms reviewed.  All questions answered.  Encouraged daily fetal movement counting and to call or return to clinic with any questions, concerns, or as needed.  "

## 2021-06-20 NOTE — PROGRESS NOTES
"Next visit: 32 week pregnancy gilmar Spencer presents to the clinic today by herself.  Overall, feeling good.  However, attended a wedding recently 2 hours away at [place] during which time she experienced 2 hours worth of pelvic pressure and low back pain that rendered her unable to stand up.  \"I rested and ended up falling asleep.\"  Patient states he woke up feeling fine and denied loss of fluid or vaginal bleeding.  Currently, baby is active, and she denies regular uterine contractions, loss of fluid or vaginal bleeding.  Indeed she endorses intermittent Pender Godwin contractions throughout the day.  Denies unusual vaginal symptoms or dysuria.  Past obstetric history of  labor at 31 weeks and subsequent delivery at 42 weeks (primary C-birth).  Planning repeat  birth on 2018 scheduled with Dr. aDy BELTRAN.  Drinking about 3 L of water per day.  No recent IC. Wet prep obtained today: Positive for yeast.  Prescription of clotrimazole 1% vaginal cream sent to preferred pharmacy.   labor precautions and danger signs and symptoms reviewed.  All questions answered.  Encouraged daily fetal movement counting and to call or return to clinic with any questions, concerns, or as needed.  "

## 2021-06-21 NOTE — ANESTHESIA POSTPROCEDURE EVALUATION
Patient: Johanna Tatum  REPEAT  SECTION  Anesthesia type: spinal    Patient location: PACU  Last vitals:   Vitals:    18 0953   BP: 123/83   Pulse: (!) 52   Resp: 17   Temp:    SpO2: 100%     Post vital signs: stable  Level of consciousness: awake and responds to simple questions  Post-anesthesia pain: pain controlled  Post-anesthesia nausea and vomiting: no  Pulmonary: unassisted, return to baseline  Cardiovascular: stable and blood pressure at baseline  Hydration: adequate  Anesthetic events: no    QCDR Measures:  ASA# 11 - Trinity-op Cardiac Arrest: ASA11B - Patient did NOT experience unanticipated cardiac arrest  ASA# 12 - Trinity-op Mortality Rate: ASA12B - Patient did NOT die  ASA# 13 - PACU Re-Intubation Rate: NA - No ETT / LMA used for case  ASA# 10 - Composite Anes Safety: ASA10A - No serious adverse event    Additional Notes:

## 2021-06-21 NOTE — PROGRESS NOTES
"S:  The patient is here for a post-op visit following a  section as an elective repeat.  Her surgery was on 18.  She has been feeling well overall.  Her complaints are none.  Her lochia is minimal.  Pain is mostly gone.    O:  /84   Pulse 78   Ht 5' 6\" (1.676 m)   Wt 155 lb (70.3 kg)   LMP 2018 (Approximate)  Body mass index is 25.02 kg/m .    Abdomen soft, non tender, non distended, incision healing well, uterus 14-16 week size    A:  Normal post-op check    P:  Activity and restrictions d/w the patient.  Follow up in 4 weeks with the CNMs for regular post-partum appointment.  "

## 2021-06-21 NOTE — ANESTHESIA CARE TRANSFER NOTE
Last vitals:     Patient's level of consciousness is awake  Spontaneous respirations: yes  Maintains airway independently: yes  Dentition unchanged: yes  Oropharynx: oropharynx clear of all foreign objects    QCDR Measures:  ASA# 20 - Surgical No Data Recorded  PQRS# 430 - Adult PONV Prevention: 4558F - Pt received => 2 anti-emetic agents (different classes) preop & intraop  ASA# 8 - Peds PONV Prevention: NA - Not pediatric patient, not GA or 2 or more risk factors NOT present  PQRS# 424 - Trinity-op Temp Management: 4559F - At least one body temp DOCUMENTED => 35.5C or 95.9F within required timeframe  PQRS# 426 - PACU Transfer Protocol: - Transfer of care checklist used  ASA# 14 - Acute Post-op Pain: ASA14B - Patient did NOT experience pain >= 7 out of 10

## 2021-06-21 NOTE — PROGRESS NOTES
"Johanna presents to the clinic by herself.  All is well.  Called with increased pelvic pressure on Friday, 10/12/2018 which continues.  Baby is active, and she denies regular uterine contractions, loss of fluid or vaginal bleeding.  \"I think I have birth amnesia.\"  Patient is worried she will not know if she is christi.  Discussed at length.  Comfort measures given for discomforts of pregnancy.  BS RV culture and hemoglobin today.  Repeat  birth scheduled with Dr. Day Solorio on 2018 at 7:30 AM at [facility].  Encouraged to make one more clinic appointment with Dr. Day Solorio for any last minute discussion or questions.   labor precautions and danger signs and symptoms reviewed.  All questions answered.  Encouraged daily fetal movement counting and to call or return to clinic with any questions, concerns, or as needed.  "

## 2021-06-21 NOTE — ANESTHESIA PROCEDURE NOTES
Spinal Block    Start time: 11/7/2018 7:35 AM  End time: 11/7/2018 7:45 AM  Reason for block: primary anesthetic    Staffing:  Performing  Anesthesiologist: RICKI KIRKPATRICK    Preanesthetic Checklist  Completed: patient identified, risks, benefits, and alternatives discussed, timeout performed, consent obtained, airway assessed, oxygen available, suction available, emergency drugs available and hand hygiene performed  Spinal Block  Patient position: sitting  Prep: ChloraPrep  Patient monitoring: heart rate, cardiac monitor, continuous pulse ox and blood pressure  Approach: midline  Location: L4-5  Injection technique: single-shot  Needle type: pencil-tip   Needle gauge: 24 G    Assessment  Sensory level: T6

## 2021-06-21 NOTE — PROGRESS NOTES
Johanna presents to the clinic by herself.  Overall, feeling well!  Baby is very active, and she denies regular uterine contractions, loss of fluid or vaginal bleeding.  Saw Dr. Day Solorio on 10/18/2018 in order to field questions regarding scheduled repeat  birth on 2018 at 7:30 AM at [facility].  Continues to decline influenza vaccination.  GBS RV culture results: NEGATIVE, and hemoglobin 11.2 g/dL last week.  Term labor precautions and danger signs and symptoms reviewed.  All questions answered.  Encouraged daily fetal movement counting and to call or return to clinic with any questions, concerns, or as needed.

## 2021-06-21 NOTE — PROGRESS NOTES
"S: The patient is here in follow up of her scheduled  section for 18.  See note from 18.  She is nervous as she thinks about a planned surgery.  She would like to have delayed cord clamping and the clear drape for surgery.  She is keeping her placenta.  She isn't sure if her  would like to cut the cord.    Outpatient Medications Prior to Visit   Medication Sig Dispense Refill     cholecalciferol, vitamin D3, (VITAMIN D3 ORAL) Take by mouth. Taking liquid Vitamin D       ferrous sulfate (IRON ORAL) Take by mouth. Taking Liquid Iron       multivitamin therapeutic tablet        folic acid (FOLVITE) 800 MCG tablet Take 800 mcg by mouth daily.       No facility-administered medications prior to visit.        Patient is allergic to prednisone.    O:  /60  Pulse 80  Ht 5' 6\" (1.676 m)  Wt 167 lb (75.8 kg)  LMP 2018 (Approximate)  Breastfeeding? No  BMI 26.95 kg/m2          Body mass index is 26.95 kg/(m^2).    General: WN/WD WF, NAD    Assessment: 28 y.o. MWF  at 36.2 weeks gestation with a  section scheduled 18.    Plan: We discussed that her fears with surgery are very common, normal, and appropriate.  We discussed the logistics of the day of surgery with nothing to eat or drink for 8 hours ahead of time.  We discussed that while her  can't cut the cord on the operating table, I can leave the cord long and he can cut it at the warmer if he wants to.  Questions were answered.  She will let me know if anything else comes up before the surgery.  We did discuss that if she goes into labor or breaks her water, she should call the CNMs for evaluation and earlier .  Approximately 15 minutes were spent with the patient with the majority in counseling.    "

## 2021-06-21 NOTE — ANESTHESIA PREPROCEDURE EVALUATION
Anesthesia Evaluation      Patient summary reviewed   No history of anesthetic complications     Airway   Mallampati: II   Pulmonary - normal exam   (+) asthma (sports induced)  mild,  well controlled,   (-) COPD, sleep apnea, not a smoker                         Cardiovascular   Exercise tolerance: > or = 4 METS  (-) hypertension, past MI, CAD  Rhythm: regular  Rate: normal,         Neuro/Psych    (-) no seizures, no CVA    Endo/Other    (-) no diabetes, hypothyroidism     GI/Hepatic/Renal    (+) GERD,     (-) impaired hepatic function, renal disease     Other findings: 28 y.o.  presenting for repeat C/S.    Labs  18:  Hgb pending  Type and screen pending    10/17/18: Hgb 11.2      Dental - normal exam                        Anesthesia Plan  Planned anesthetic: spinal and peripheral nerve block  Plan for spinal with intrathecal morphine plus postoperative bilateral TAP blocks for analgesia.   Pt high risk for aspiration and will receive preop metoclopramide, ranitidine / famotidine plus sodium bicitrate.   Plan for dexamethasone, zofran for PONV.  Discussed potential need to convert GA.  Discussed potential need for blood transfusion.    ASA 2     Anesthetic plan and risks discussed with: spouse and patient  Anesthesia plan special considerations: antiemetics,   Post-op plan: routine recovery

## 2021-06-21 NOTE — ANESTHESIA PROCEDURE NOTES
Peripheral Block    Patient location during procedure: OR  Start time: 11/7/2018 8:37 AM  End time: 11/7/2018 8:42 AM  post-op analgesia per surgeon order as noted in medical record  Staffing:  Performing  Anesthesiologist: RICKI KIRKPATRICK  Preanesthetic Checklist  Completed: patient identified, site marked, risks, benefits, and alternatives discussed, timeout performed, consent obtained, airway assessed, oxygen available, suction available, emergency drugs available and hand hygiene performed  Peripheral Block  Nerve block type: TAP.  Prep: ChloraPrep  Patient position: supine  Patient monitoring: cardiac monitor, continuous pulse oximetry, heart rate and blood pressure  Laterality: bilateral, same technique used bilaterally  Injection technique: ultrasound guided    Ultrasound used to visualize needle placement in proximity to nerve being blocked: yes   Permanent ultrasound image captured for medical record  Sterile gel and probe cover used for ultrasound.    Needle  Needle type: Stimuplex   Needle gauge: 21 G  Needle length: 4 in  no peripheral nerve catheter placed  Assessment  Injection assessment: no difficulty with injection, negative aspiration for heme, no paresthesia on injection and incremental injection

## 2021-06-21 NOTE — PROGRESS NOTES
Johanna presents to the clinic with her  Polo!  They are thrilled to welcome their baby next Wednesday, 2018 at 7:30 AM at [facility] via scheduled repeat  birth with Dr. Day Solorio.  36-week hemoglobin 11.2 g/dL, and GBS NEGATIVE.  Johanna continues to take iron supplementation and prenatal vitamin.  EFW today Leopold's maneuvers: 7 pounds 12 ounces.  Baby is active, and she denies regular uterine contractions, loss of fluid or vaginal bleeding.  Term labor precautions and danger signs and symptoms reviewed.  All questions answered.  Encouraged daily fetal movement counting and to call or return to clinic with any questions, concerns, or as needed.

## 2021-06-22 NOTE — PROGRESS NOTES
6-week Postpartum Visit:     Assessment:   1.  6 weeks postpartum, status post scheduled repeat  birth  2.  Lactating mother: Pumping breastmilk and supplementing with formula  3.  Possible dysphoric milk ejection reflex  4.  Contraceptive management    Plan:   - Reviewed warning signs of postpartum depression. [place]  Mental Health Resource List given for future reference prn.   -PP exercises reviewed and encouraged modified abdominal crunches and Kegels daily. Encouraged integrating formal exercise, such as walking 20-30mns daily.   -Warning signs of breast infections of mastitis and thrush reviewed. Breastfeeding support resource list and contact info given, including [facility] Lactation Consultant and [facility]act[facility].   -Encouraged to continue with PNV, Vitamin D and DHA supplements.   - Return of fertility discussed. Plans intrauterine device for contraception.   -Reviewed warning signs of pelvic pain, excessive bleeding or abdominal pain, fever/chills, or signs of breast infection.   -Referral recommended: Lactation to further discuss possible dysphoric milk ejection reflex.   -Labs today: None  -RTC for intrauterine device insertion.  Recommend no unprotected sexual intercourse for 2 weeks prior to insertion.  -RTC for routine health maintenance or sooner as needed.     TT with patient 40 mns, >50% time spent in counseling or coordination of care.     Subjective:   Johanna is a 29yo, here for her 6 week postpartum exam. She is integrating birth experience/care and transition well. Bleeding and uterine cramping have ceased. Denies pain. Reports normal bowel and bladder functioning. EPDS score 5/30. Reports good family/friend support.  Breastfeeding/pumping only well-established; believes she may have dysphoric milk ejection reflex.  Coping all right at this time and plans to continue pumping with formula supplementation as needed. Feeding q 2-3 hours.  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     Has not resumed intercourse. Denies pain or concerns. Plans to use intrauterine device for contraception.     Review of Systems  Pertinent items are noted in HPI.      Objective:     Physical Exam:  General: Pleasant, articulate, well-groomed, well-nourished female.  Not in any apparent distress.  Neck: Supple.  Thyroid: Small, symmetrical, no nodules noted.  Lymphadenopathy: Negative.  Lungs: Clear to auscultation bilaterally, and a nonlabored breathing pattern.  Cardio: Regular rate and rhythm, negative for murmur.   Breasts: Symmetrical, nontender, no masses, negative lymphadenopathy, negative nipple discharge.  Abdomen: Soft, nontender, no masses, negative CVAT.   section surgical scar is well approximated without erythema, edema or purulent discharge.  External genitalia: Normal hair distribution, no lesions.  Urethral opening: Without lesions, or tenderness.   Bladder: Without masses, or tenderness.  Vagina: Soft, nontender, no masses.  Cervix: Closed, thick and long.  Bimanual: Finds uterus small, well involuted, mobile, nontender, no masses.  Negative CMT.  Adnexa, without masses or tenderness.    Lower extremities: +1 reflexes, no significant edema.

## 2021-06-23 NOTE — TELEPHONE ENCOUNTER
"Jose Spencer,    I am Yarely Salcido and I am a student nurse-midwife that is working with the [facility].     I got your message about your first postpartum period. 10 days is certainly an annoyance, but can be totally normal in this postpartum time as your hormones balance out after the birth. I also see that you had an IUD placement about a month ago, but I don't think that the prolonged bleeding would be related with that in particular unless you were experiencing constant, painful cramping (thoughts that the IUD may be expelling). Elissa santos taught you how to check your IUD strings. If you do that and feel a hard \"tip\" of the IUD at the end of your cervix, you would want to be seen to confirm placement of the IUD.     What would also be concerning is if your period is really heavy. I would like you to call if you are filling a pad within and hour and then filling the next pad in less than an hour as well.     As your body adjusts it can be totally normal to have a \"weird\" period. I hope that helps. Feel free to reach out if you have any questions or concerns.    Best,     RYAN Hernandez (and Maia Willoughby CNM)   "

## 2021-06-24 NOTE — TELEPHONE ENCOUNTER
Patient did not get Malinda Faustin's response. She states now that she has had on going heavy bleeding and would like to discuss this with a midwife today as she had to cancel today's visit.

## 2021-06-24 NOTE — PROGRESS NOTES
Assessment:     1.  IUD removal, Mirena  2.  Contraceptive management      Plan:   Warning signs were reviewed with the patient including bleeding, pain and infection.   Discussed BCM options, and patient plans to use condoms.  RTC if another Mirena IUD insertion desired or prn.    TT with patient 25 mns >50% time spent in counseling or coordination of care.     Subjective:       Johanna Tatum is a 29 y.o. female who presents for IUD removal. Reason for IUD removal: Malpositioned per pelvic ultrasound performed yesterday 2/27/2019. She had a Mirena inserted on 1/16/2019. Reports almost constant vaginal bleeding/spotting since insertion. Had a period lasting 11 days after Mirena IUD insertion followed by 1-1/2 weeks without vaginal spotting followed by daily light flow bright red vaginal spotting since without clots. Pelzer x1 without pain. Denies fever, chills, lower abdominal pain or foul-smelling vaginal discharge.  Minimal cramping, well-controlled with Ibuprofen.     Review of Systems  Pertinent items are noted in HPI.      Objective:     PROCEDURE:  A speculum was placed and the IUD strings were visualized.  The IUD strings were grasped with a ring forceps and gentle traction was applied.  The Mirena IUD was easily removed and was noted to be intact.  The speculum was then removed.    US Pelvis With Transvaginal Non OB (Order [id number])   Imaging   Date: 2/27/2019 Department: [facility] Ultrasound Released By: Maryjo Black RDMS Authorizing: Elissa Can APRN,ANN   Study Result     US PELVIS WITH TRANSVAGINAL NON OB  2/27/2019 2:08 PM     INDICATION: Very short iud strings; please check for iud placement  TECHNIQUE: Transabdominal scans were performed. Endovaginal ultrasound was performed to better visualize the adnexa.  COMPARISON: None.      FINDINGS:  Uterus measures 7.2 x 5.0 x 4.7 cm. Normal uterus with no masses.     Endometrial thickness is 7 mm. Intrauterine device in a  somewhat low position in the endometrial cavity of the corpus extending to the internal cervical os.     Right ovary measures 2.9 x 2.9 x 2.5 cm. 2.7 cm simple cyst consistent with a dominant follicle. Normal arterial duplex.     Left ovary measures 1.8 x 1.0 x 0.8 cm. Normal left ovary. Normal arterial duplex.     No significant free fluid in the cul-de-sac.     IMPRESSION:   CONCLUSION:  1.  Intrauterine device in a somewhat low position in the endometrial cavity of the corpus extending to the internal cervical os.  2.  Otherwise normal examination.

## 2021-06-24 NOTE — TELEPHONE ENCOUNTER
Name of caller: Patient  Phone number where you may be reached: [phone]  Reason for call: pt had to reschedule her iud ck appt due to weather, for next Wed 2/27. Pt stated she has not been able to see any response email in her MyChart from the 2/6 email exchange. Pls call pt to discuss the response from 2/6.  Best time to call back: any  If we don't reach you, is it okay to leave a detailed message? no

## 2021-06-24 NOTE — TELEPHONE ENCOUNTER
"Called patient back to discuss her concerns of irregular and prolonged bleeding since placement of Mirena IUD. States she got her period back a few weeks ago and the period lasted 2 weeks. She had no bleeding for 1 week after and has now had bleeding again. She is wearing panty liners and changing them \"Every time I go to the bathroom\". Is used to 4 day periods. Denies heavy bleeding or clots. Discussed normalcy of irregular bleeding in the first few months after placement of a hormonal IUD as the body adjusts. Also discussed that her own hormones are still adjusting following the birth of her baby and her periods may be different. Discussed option to try Ibuprofen regimen of 600 mg every 6 hours for 3-5 days as this may decrease bleeding. Pt does plan to come for IUD check in 1 week as well.  "

## 2021-07-03 NOTE — ADDENDUM NOTE
Addendum Note by Elissa Liriano APRN, CNM at 9/19/2018  4:41 PM     Author: Elissa Liriano APRN, CNM Service: -- Author Type: Midwife    Filed: 9/19/2018  4:41 PM Encounter Date: 9/19/2018 Status: Signed    : Elissa Liriano APRN, CNM (Midwife)    Addended by: ELISSA LIRIANO on: 9/19/2018 04:41 PM        Modules accepted: Orders

## 2021-07-14 PROBLEM — Z98.891 STATUS POST REPEAT LOW TRANSVERSE CESAREAN SECTION: Status: RESOLVED | Noted: 2018-11-07 | Resolved: 2018-12-19

## 2021-07-14 PROBLEM — O26.899 ABDOMINAL CRAMPING AFFECTING PREGNANCY: Status: RESOLVED | Noted: 2018-09-19 | Resolved: 2018-10-03

## 2021-07-14 PROBLEM — R10.9 ABDOMINAL CRAMPING AFFECTING PREGNANCY: Status: RESOLVED | Noted: 2018-09-19 | Resolved: 2018-10-03

## 2021-07-14 PROBLEM — O09.93 SUPERVISION OF HIGH RISK PREGNANCY IN THIRD TRIMESTER: Status: RESOLVED | Noted: 2018-04-17 | Resolved: 2018-12-19

## 2021-08-08 ENCOUNTER — HEALTH MAINTENANCE LETTER (OUTPATIENT)
Age: 31
End: 2021-08-08

## 2021-08-26 NOTE — PROGRESS NOTES
IUD Insertion Procedure Note    Pre-operative Diagnosis: Contraceptive management    Post-operative Diagnosis: same    Indications: Contraception    HPI/ROS:   Johanna Tatum is a 28 y.o. female who presents for IUD insert. LMP February 6, 2018. Current birth control method condoms. She is currently 10 weeks postpartum.     6 week postpartum exam on 12/19/2018, IUD consult done at that time.     Reports no unprotected intercourse in the last two weeks.     Urine pregnancy test was performed in clinic and was negative. Pt education included mechanism of action of Mirena IUD. The risks (including infection, bleeding, pain, and uterine perforation) and benefits of the procedure were explained to the patient and informed consent was obtained and consent form signed.     Procedure Details   Bimanual exam performed revealing a retroflexed uterus, midline, non-tender, negative CMT. Cervix is nulliparous, long, thick, closed. Sterile speculum placed. GC/CT collected. Cervix cleansed with Betadine. Tenaculum placed on cervix at 7 o'clock and 5 o'clock. Uterus sounded to 6 cm. IUD inserted without difficulty. String visible and trimmed to 3 cm. Tenaculum removed. Minimal bleeding noted. Patient tolerated procedure well. Did not have any periods of syncope, sat up and ambulated with ease.     IUD Information:  Mirena, Expiration date April 2021.  Lot #[id number]    Condition:  Stable    Complications:  None    Plan:  -Discussed danger signs and symptoms of the IUD including how to check for strings. Instructed patient to check her strings monthly. Discussed when/where to call with any fever, severe back pain, severe abdominal pain, heavy bleeding (soaking more than 1 pad per hour). Also encouraged to call if she does not feel her strings. She was advised to use Ibuprofen as needed for mild to moderate pain. Manufactures information given for patient education.   -Mirena IUD should be removed by January 16, 2024  -Encouraged to  Second attempt to reach patient for pre-assessment. Male who answered both times said it was a wrong number. Patient not active on My chart. Unable to reach at this time.   return to clinic in 4-6 weeks for IUD check or sooner prn.     Total time with patient 40 mn >50% time spent in counseling or coordination of care.

## 2021-09-01 ENCOUNTER — TELEPHONE (OUTPATIENT)
Dept: MIDWIFE SERVICES | Facility: CLINIC | Age: 31
End: 2021-09-01

## 2021-10-04 ENCOUNTER — HEALTH MAINTENANCE LETTER (OUTPATIENT)
Age: 31
End: 2021-10-04

## 2022-03-10 ENCOUNTER — VIRTUAL VISIT (OUTPATIENT)
Dept: MIDWIFE SERVICES | Facility: CLINIC | Age: 32
End: 2022-03-10
Payer: COMMERCIAL

## 2022-03-10 DIAGNOSIS — N93.9 ABNORMAL UTERINE BLEEDING: Primary | ICD-10-CM

## 2022-03-10 DIAGNOSIS — N92.0 MENORRHAGIA WITH REGULAR CYCLE: ICD-10-CM

## 2022-03-10 PROCEDURE — 99213 OFFICE O/P EST LOW 20 MIN: CPT | Mod: TEL | Performed by: ADVANCED PRACTICE MIDWIFE

## 2022-03-10 NOTE — PROGRESS NOTES
"Johanna is a 32 year old who is being evaluated via a billable telephone visit.      What phone number would you like to be contacted at? [phone]  How would you like to obtain your AVS? MyChart    Assessment & Plan     Abnormal uterine bleeding  Menorrhagia with regular cycle    - US Pelvic Complete with Transvaginal; Future  - Ob/Gyn Referral; Future    (Recent blood work performed as a new employee including hemoglobin.  Encouraged to bring to consultation with Dr. Day Solorio.)      16 minutes spent on the date of the encounter doing chart review, history and exam, documentation and further activities per the note       FURTHER TESTING:       -Pelvic ultrasound  CONSULTATION/REFERRAL to OB/GYN    Elissa Can CNM  [facility]   Johanna is a 32 year old who presents for the following health issues: Reports history of abnormal uterine bleeding, namely menorrhagia for which she has tried Mirena IUD, combined oral contraceptive pills and NuvaRing.  She experiences side effects from hormonal birth control including but not limited to decreased libido, depressed mood, and a general sense of \"just feeling terrible.\"  She could not believe how well she felt after recently self discontinuing combined oral contraceptive pills in 2021 (coincidentally at the same time she started counseling/therapy).  Unfortunately, that decision to discontinue combined oral contraceptive pills resulted in very heavy menstrual bleeding, and she noticed that her menstrual cycle frequency has shortened to q. 20 days.  Johanna states that her mother disclosed she underwent endometrial ablation for the same reason and recommended that her daughter look into it.  Johanna is excited to know Dr. Solorio can see her for this purpose as Dr. Solorio was present for the birth of her second child, a scheduled  birth 2018.  Recent new employment for which she underwent multiple " blood tests, hemoglobin in February 2022: 13.5 g/dL per patient.  TSH was not obtained.    Review of Systems   Constitutional, and gu systems are negative, except as otherwise noted.      Objective       Vitals:  No vitals were obtained today due to virtual visit.    Physical Exam   healthy, alert and no distress  PSYCH: Alert and oriented times 3; coherent speech, normal   rate and volume, able to articulate logical thoughts, able   to abstract reason, no tangential thoughts, no hallucinations   or delusions  Her affect is normal  RESP: No cough, no audible wheezing, able to talk in full sentences  Remainder of exam unable to be completed due to telephone visits      Phone call duration: 11 minutes

## 2022-03-29 ENCOUNTER — OFFICE VISIT (OUTPATIENT)
Dept: OBGYN | Facility: CLINIC | Age: 32
End: 2022-03-29
Payer: COMMERCIAL

## 2022-03-29 VITALS
HEART RATE: 76 BPM | WEIGHT: 141 LBS | DIASTOLIC BLOOD PRESSURE: 68 MMHG | BODY MASS INDEX: 22.66 KG/M2 | OXYGEN SATURATION: 99 % | SYSTOLIC BLOOD PRESSURE: 104 MMHG | HEIGHT: 66 IN

## 2022-03-29 DIAGNOSIS — N92.0 MENORRHAGIA WITH REGULAR CYCLE: ICD-10-CM

## 2022-03-29 LAB — TSH SERPL DL<=0.005 MIU/L-ACNC: 1.09 UIU/ML (ref 0.3–5)

## 2022-03-29 PROCEDURE — 84443 ASSAY THYROID STIM HORMONE: CPT | Performed by: OBSTETRICS & GYNECOLOGY

## 2022-03-29 PROCEDURE — 36415 COLL VENOUS BLD VENIPUNCTURE: CPT | Performed by: OBSTETRICS & GYNECOLOGY

## 2022-03-29 PROCEDURE — 58100 BIOPSY OF UTERUS LINING: CPT | Performed by: OBSTETRICS & GYNECOLOGY

## 2022-03-29 PROCEDURE — 88305 TISSUE EXAM BY PATHOLOGIST: CPT | Performed by: PATHOLOGY

## 2022-03-29 PROCEDURE — 99203 OFFICE O/P NEW LOW 30 MIN: CPT | Mod: 25 | Performed by: OBSTETRICS & GYNECOLOGY

## 2022-03-29 NOTE — PROGRESS NOTES
CC: Johanna Tatum is here secondary to heavy periods.    HPI: The pt is a 32 year old MWF  who presents with worsening periods since the start of the year.  Her periods have been since her last child in 2018.  She tried a Mirena when she was 8-10 weeks postpartum, but she had daily bleeding for 6 weeks so she had it removed.  After 6-8 months she tried NuvaRing because her periods were lasting 10 days with cramps, but it caused decreased libido and some mental health issues.  She stopped that, but started OCPs about a year ago.  She had similar reactions, so she stopped that in Aug of 2021.  Her periods in the rest of  were monthly and lasted about 10 days.  Since the beginning of , her periods have continued to last 10-12 days, but she only has a week in between periods.  They are heavy enough that she needs to wear an overnight pad all the time that she changes about every hour.  She also gets cramps with her periods.  She would like to avoid hormones if possible.  She has not had a thyroid level since 2018 and no ultrasound since 2019.    Past Medical History:   Diagnosis Date     Anemia      Asthma     sports induced, used albuterol     Seasonal allergies     hayfever       Past Surgical History:   Procedure Laterality Date     APPENDECTOMY       APPENDECTOMY       C/SECTION, LOW TRANSVERSE  2013    arrest of descent      SECTION N/A 2018    Procedure: REPEAT  SECTION;  Surgeon: Day Solorio MD;  Location: [facility];  Service:      COMBINED AUGMENTATION MAMMAPLASTY AND ABDOMINOPLASTY Bilateral 3 years ago    x2      WISDOM TOOTH EXTRACTION         Patient's   Family History   Problem Relation Age of Onset     Substance Abuse Mother         In recovery now for 18 years     Depression Father      Alcoholism Father      Substance Abuse Father      Mental Illness Father      Thyroid Disease Maternal Grandmother      Cancer Maternal Grandmother          "Lymphoma     Dementia Maternal Grandmother      Hearing Loss Maternal Grandmother      Prostate Cancer Maternal Grandfather      No Known Problems Paternal Grandmother      Cerebral aneurysm Paternal Grandfather      Depression Paternal Grandfather      Mental Illness Paternal Grandfather      No Known Problems Brother      No Known Problems Sister      No Known Problems Daughter      No Known Problems Sister      No Known Problems Son        Patient   Social History     Socioeconomic History     Marital status:      Spouse name: None     Number of children: 2     Years of education: college grad     Highest education level: None   Occupational History     None   Tobacco Use     Smoking status: Never Smoker     Smokeless tobacco: Never Used   Substance and Sexual Activity     Alcohol use: No     Comment: occationally     Drug use: No     Sexual activity: Yes     Partners: Male     Birth control/protection: Inserts/Ring   Other Topics Concern     Parent/sibling w/ CABG, MI or angioplasty before 65F 55M? No   Social History Narrative     None     Social Determinants of Health     Financial Resource Strain: Not on file   Food Insecurity: Not on file   Transportation Needs: Not on file   Physical Activity: Not on file   Stress: Not on file   Social Connections: Not on file   Intimate Partner Violence: Not on file   Housing Stability: Not on file       Outpatient Medications Prior to Visit   Medication Sig Dispense Refill     etonogestrel-ethinyl estradiol (NUVARING) 0.12-0.015 MG/24HR vaginal ring Place 1 each vaginally every 28 days (Patient not taking: Reported on 3/29/2022)       No facility-administered medications prior to visit.       Patient is allergic to prednisone.    ROS:  12 part ROS is negative aside from those symptoms in the HPI    PE:  /68 (BP Location: Right arm, Patient Position: Sitting, Cuff Size: Adult Regular)   Pulse 76   Ht 1.676 m (5' 6\")   Wt 64 kg (141 lb)   LMP 03/20/2022     "       Body mass index is 22.76 kg/m .    General: WN/WD WF, NAD  EG/BUS wnl  Vagina no lesions, no abnormal discharge  Cervix no lesions, tenaculum was used, os finder was used  Uterus sounds to 6 cm, 1 pass with moderate return  Pt tolerated procedure well  Psych: normal mood  Neuro: CN I-XII grossly intact  MS: normal gait    Assessment: 32 year old MWF  with menorrhagia     Plan: Natural history of menorrhagia causes discussed with the patient.  Ultrasound and TSH were ordered to evaluate for possible causes.  EMB was done.  After discussing options, she would like to have an ablation if possible.  She will be notified with the results of the EMB.  Ablation will be scheduled in the office or at [facility], whichever is sooner.  Questions were answered to the best of my ability.    44 minutes spent on the date of the encounter doing chart review, review of test results, patient visit and documentation

## 2022-03-30 DIAGNOSIS — Z11.59 ENCOUNTER FOR SCREENING FOR OTHER VIRAL DISEASES: Primary | ICD-10-CM

## 2022-03-30 PROBLEM — N92.0 MENORRHAGIA WITH REGULAR CYCLE: Status: ACTIVE | Noted: 2022-03-30

## 2022-03-31 LAB
PATH REPORT.COMMENTS IMP SPEC: NORMAL
PATH REPORT.COMMENTS IMP SPEC: NORMAL
PATH REPORT.FINAL DX SPEC: NORMAL
PATH REPORT.GROSS SPEC: NORMAL
PATH REPORT.MICROSCOPIC SPEC OTHER STN: NORMAL
PATH REPORT.RELEVANT HX SPEC: NORMAL
PHOTO IMAGE: NORMAL

## 2022-04-14 ENCOUNTER — OFFICE VISIT (OUTPATIENT)
Dept: MIDWIFE SERVICES | Facility: CLINIC | Age: 32
End: 2022-04-14

## 2022-04-14 VITALS
BODY MASS INDEX: 23.3 KG/M2 | WEIGHT: 145 LBS | HEART RATE: 72 BPM | SYSTOLIC BLOOD PRESSURE: 108 MMHG | DIASTOLIC BLOOD PRESSURE: 66 MMHG | HEIGHT: 66 IN

## 2022-04-14 DIAGNOSIS — N92.0 MENORRHAGIA WITH REGULAR CYCLE: ICD-10-CM

## 2022-04-14 DIAGNOSIS — N93.9 ABNORMAL UTERINE BLEEDING: Primary | ICD-10-CM

## 2022-04-14 PROCEDURE — 99214 OFFICE O/P EST MOD 30 MIN: CPT | Performed by: ADVANCED PRACTICE MIDWIFE

## 2022-04-14 NOTE — H&P (VIEW-ONLY)
[facility]  [address] [address]  Phone: [phone]  Fax: [phone]  Primary Provider: No Ref-Primary, Physician  Pre-op Performing Provider: PATRICK LIRIANO    PREOPERATIVE EVALUATION:  Today's date: 4/14/2022    Johanna Tatum is a 32 year old female who presents for a preoperative evaluation.    Surgical Information:        Surgery/Procedure: Hysteroscopy and Novasure  Surgery Location: [facility]  Surgeon: Dr. Day Solorio  Surgery Date: 04/27/2022  Time of Surgery: 9:15AM  Where patient plans to recover: At home with family  Fax number for surgical facility: Note does not need to be faxed, will be available electronically in Epic.    Type of Anesthesia Anticipated: Local with MAC    Assessment & Plan     The proposed surgical procedure is considered LOW risk.    Abnormal uterine bleeding      Menorrhagia with regular cycle        Possible Sleep Apnea: No   STOP-Bang Total Score 4/14/2022   Total Score 1      Implanted Device: NA      Risks and Recommendations:  The patient has the following additional risks and recommendations for perioperative complications:   - No identified additional risk factors other than previously addressed      RECOMMENDATION:  APPROVAL GIVEN to proceed with proposed procedure, without further diagnostic evaluation.              25 minutes spent on the date of the encounter doing chart review, history and exam, documentation and further activities per the note      Subjective     HPI related to upcoming procedure:       No flowsheet data found.    Health Care Directive:  Patient does not have a Health Care Directive or Living Will: Discussed advance care planning with patient; however, patient declined at this time.    Preoperative Review of :      Status of Chronic Conditions:  See problem list for active medical problems.  Problems all longstanding and stable, except as noted/documented.  See  ROS for pertinent symptoms related to these conditions.      Review of Systems  Constitutional, neuro, ENT, endocrine, pulmonary, cardiac, gastrointestinal, genitourinary, musculoskeletal, integument and psychiatric systems are negative, except as otherwise noted.    Patient Active Problem List    Diagnosis Date Noted     Menorrhagia with regular cycle 2022     Priority: Medium     Added automatically from request for surgery [id number]       Abnormal uterine bleeding 03/10/2022     Priority: Medium     Menorrhagia       Uses birth control 2018     Priority: Medium     2021: Combined oral contraceptive pills         History of asthma 2018     Priority: Medium     History of exercise induced asthma         Episodic mood disorder (H) 2006     Priority: Medium      Past Medical History:   Diagnosis Date     Anemia      Asthma     sports induced, used albuterol     Seasonal allergies     hayfever     Past Surgical History:   Procedure Laterality Date     APPENDECTOMY  2002     C/SECTION, LOW TRANSVERSE  2013    arrest of descent      SECTION N/A 2018    Procedure: REPEAT  SECTION;  Surgeon: Day Solorio MD;  Location: [facility];  Service:      COMBINED AUGMENTATION MAMMAPLASTY AND ABDOMINOPLASTY Bilateral 3 years ago    x2      WISDOM TOOTH EXTRACTION  2008     No current outpatient medications on file.       Allergies   Allergen Reactions     Prednisone Rash        Social History     Tobacco Use     Smoking status: Never Smoker     Smokeless tobacco: Never Used   Substance Use Topics     Alcohol use: No     Comment: occationally     Family History   Problem Relation Age of Onset     Substance Abuse Mother         In recovery now for 18 years     Depression Father      Alcoholism Father      Substance Abuse Father      Mental Illness Father      Thyroid Disease Maternal Grandmother      Cancer Maternal Grandmother         Lymphoma     Dementia  "Maternal Grandmother      Hearing Loss Maternal Grandmother      Prostate Cancer Maternal Grandfather      No Known Problems Paternal Grandmother      Cerebral aneurysm Paternal Grandfather      Depression Paternal Grandfather      Mental Illness Paternal Grandfather      No Known Problems Brother      No Known Problems Sister      No Known Problems Daughter      No Known Problems Sister      No Known Problems Son      History   Drug Use No     Objective     /66 (BP Location: Right arm, Patient Position: Sitting, Cuff Size: Adult Regular)   Pulse 72   Ht 1.676 m (5' 6\")   Wt 65.8 kg (145 lb)   LMP 03/20/2022   Breastfeeding No   BMI 23.40 kg/m      Physical Exam    GENERAL APPEARANCE: healthy, alert and no distress     EYES: EOMI, PERRL     HENT: ear canals and TM's normal and nose and mouth without ulcers or lesions     NECK: no adenopathy, no asymmetry, masses, or scars and thyroid normal to palpation     RESP: lungs clear to auscultation - no rales, rhonchi or wheezes     CV: regular rates and rhythm, normal S1 S2     ABDOMEN:  soft, nontender, no HSM or masses and bowel sounds normal     MS: extremities normal- no gross deformities noted, no evidence of inflammation in joints, FROM in all extremities.     SKIN: no suspicious lesions or rashes, multiple tattoos     NEURO: Normal strength and tone, sensory exam grossly normal, mentation intact and speech normal     PSYCH: mentation appears normal. and affect normal/bright     LYMPHATICS: No cervical adenopathy    Diagnostics:     No EKG required for low risk surgery (cataract, skin procedure, breast biopsy, etc).    Revised Cardiac Risk Index (RCRI):  The patient has the following serious cardiovascular risks for perioperative complications:   - No serious cardiac risks = 0 points     RCRI Interpretation: 0 points: Class I (very low risk - 0.4% complication rate)           Signed Electronically by: Elissa Can CNM  Copy of this evaluation report " is provided to requesting physician.

## 2022-04-14 NOTE — PROGRESS NOTES
[facility]  [address] [address]  Phone: [phone]  Fax: [phone]  Primary Provider: No Ref-Primary, Physician  Pre-op Performing Provider: PATRICK LIRIANO    PREOPERATIVE EVALUATION:  Today's date: 4/14/2022    Johanna Tatum is a 32 year old female who presents for a preoperative evaluation.    Surgical Information:        Surgery/Procedure: Hysteroscopy and Novasure  Surgery Location: [facility]  Surgeon: Dr. Day Solorio  Surgery Date: 04/27/2022  Time of Surgery: 9:15AM  Where patient plans to recover: At home with family  Fax number for surgical facility: Note does not need to be faxed, will be available electronically in Epic.    Type of Anesthesia Anticipated: Local with MAC    Assessment & Plan     The proposed surgical procedure is considered LOW risk.    Abnormal uterine bleeding      Menorrhagia with regular cycle        Possible Sleep Apnea: No   STOP-Bang Total Score 4/14/2022   Total Score 1      Implanted Device: NA      Risks and Recommendations:  The patient has the following additional risks and recommendations for perioperative complications:   - No identified additional risk factors other than previously addressed      RECOMMENDATION:  APPROVAL GIVEN to proceed with proposed procedure, without further diagnostic evaluation.              25 minutes spent on the date of the encounter doing chart review, history and exam, documentation and further activities per the note      Subjective     HPI related to upcoming procedure:       No flowsheet data found.    Health Care Directive:  Patient does not have a Health Care Directive or Living Will: Discussed advance care planning with patient; however, patient declined at this time.    Preoperative Review of :      Status of Chronic Conditions:  See problem list for active medical problems.  Problems all longstanding and stable, except as noted/documented.  See  ROS for pertinent symptoms related to these conditions.      Review of Systems  Constitutional, neuro, ENT, endocrine, pulmonary, cardiac, gastrointestinal, genitourinary, musculoskeletal, integument and psychiatric systems are negative, except as otherwise noted.    Patient Active Problem List    Diagnosis Date Noted     Menorrhagia with regular cycle 2022     Priority: Medium     Added automatically from request for surgery [id number]       Abnormal uterine bleeding 03/10/2022     Priority: Medium     Menorrhagia       Uses birth control 2018     Priority: Medium     2021: Combined oral contraceptive pills         History of asthma 2018     Priority: Medium     History of exercise induced asthma         Episodic mood disorder (H) 2006     Priority: Medium      Past Medical History:   Diagnosis Date     Anemia      Asthma     sports induced, used albuterol     Seasonal allergies     hayfever     Past Surgical History:   Procedure Laterality Date     APPENDECTOMY  2002     C/SECTION, LOW TRANSVERSE  2013    arrest of descent      SECTION N/A 2018    Procedure: REPEAT  SECTION;  Surgeon: Day Solorio MD;  Location: [facility];  Service:      COMBINED AUGMENTATION MAMMAPLASTY AND ABDOMINOPLASTY Bilateral 3 years ago    x2      WISDOM TOOTH EXTRACTION  2008     No current outpatient medications on file.       Allergies   Allergen Reactions     Prednisone Rash        Social History     Tobacco Use     Smoking status: Never Smoker     Smokeless tobacco: Never Used   Substance Use Topics     Alcohol use: No     Comment: occationally     Family History   Problem Relation Age of Onset     Substance Abuse Mother         In recovery now for 18 years     Depression Father      Alcoholism Father      Substance Abuse Father      Mental Illness Father      Thyroid Disease Maternal Grandmother      Cancer Maternal Grandmother         Lymphoma     Dementia  "Maternal Grandmother      Hearing Loss Maternal Grandmother      Prostate Cancer Maternal Grandfather      No Known Problems Paternal Grandmother      Cerebral aneurysm Paternal Grandfather      Depression Paternal Grandfather      Mental Illness Paternal Grandfather      No Known Problems Brother      No Known Problems Sister      No Known Problems Daughter      No Known Problems Sister      No Known Problems Son      History   Drug Use No     Objective     /66 (BP Location: Right arm, Patient Position: Sitting, Cuff Size: Adult Regular)   Pulse 72   Ht 1.676 m (5' 6\")   Wt 65.8 kg (145 lb)   LMP 03/20/2022   Breastfeeding No   BMI 23.40 kg/m      Physical Exam    GENERAL APPEARANCE: healthy, alert and no distress     EYES: EOMI, PERRL     HENT: ear canals and TM's normal and nose and mouth without ulcers or lesions     NECK: no adenopathy, no asymmetry, masses, or scars and thyroid normal to palpation     RESP: lungs clear to auscultation - no rales, rhonchi or wheezes     CV: regular rates and rhythm, normal S1 S2     ABDOMEN:  soft, nontender, no HSM or masses and bowel sounds normal     MS: extremities normal- no gross deformities noted, no evidence of inflammation in joints, FROM in all extremities.     SKIN: no suspicious lesions or rashes, multiple tattoos     NEURO: Normal strength and tone, sensory exam grossly normal, mentation intact and speech normal     PSYCH: mentation appears normal. and affect normal/bright     LYMPHATICS: No cervical adenopathy    Diagnostics:     No EKG required for low risk surgery (cataract, skin procedure, breast biopsy, etc).    Revised Cardiac Risk Index (RCRI):  The patient has the following serious cardiovascular risks for perioperative complications:   - No serious cardiac risks = 0 points     RCRI Interpretation: 0 points: Class I (very low risk - 0.4% complication rate)           Signed Electronically by: Elissa Can CNM  Copy of this evaluation report " is provided to requesting physician.

## 2022-04-23 ENCOUNTER — LAB (OUTPATIENT)
Dept: URGENT CARE | Facility: URGENT CARE | Age: 32
End: 2022-04-23
Attending: OBSTETRICS & GYNECOLOGY
Payer: COMMERCIAL

## 2022-04-23 DIAGNOSIS — Z11.59 ENCOUNTER FOR SCREENING FOR OTHER VIRAL DISEASES: ICD-10-CM

## 2022-04-23 LAB — SARS-COV-2 RNA RESP QL NAA+PROBE: NEGATIVE

## 2022-04-23 PROCEDURE — U0003 INFECTIOUS AGENT DETECTION BY NUCLEIC ACID (DNA OR RNA); SEVERE ACUTE RESPIRATORY SYNDROME CORONAVIRUS 2 (SARS-COV-2) (CORONAVIRUS DISEASE [COVID-19]), AMPLIFIED PROBE TECHNIQUE, MAKING USE OF HIGH THROUGHPUT TECHNOLOGIES AS DESCRIBED BY CMS-2020-01-R: HCPCS

## 2022-04-23 PROCEDURE — U0005 INFEC AGEN DETEC AMPLI PROBE: HCPCS

## 2022-04-26 ENCOUNTER — ANESTHESIA EVENT (OUTPATIENT)
Dept: SURGERY | Facility: AMBULATORY SURGERY CENTER | Age: 32
End: 2022-04-26
Payer: COMMERCIAL

## 2022-04-27 ENCOUNTER — HOSPITAL ENCOUNTER (OUTPATIENT)
Facility: AMBULATORY SURGERY CENTER | Age: 32
Discharge: HOME | End: 2022-04-27
Attending: OBSTETRICS & GYNECOLOGY
Payer: COMMERCIAL

## 2022-04-27 ENCOUNTER — ANESTHESIA (OUTPATIENT)
Dept: SURGERY | Facility: AMBULATORY SURGERY CENTER | Age: 32
End: 2022-04-27
Payer: COMMERCIAL

## 2022-04-27 VITALS
RESPIRATION RATE: 18 BRPM | SYSTOLIC BLOOD PRESSURE: 113 MMHG | HEART RATE: 57 BPM | HEIGHT: 66 IN | TEMPERATURE: 98.2 F | DIASTOLIC BLOOD PRESSURE: 87 MMHG | WEIGHT: 140 LBS | OXYGEN SATURATION: 99 % | BODY MASS INDEX: 22.5 KG/M2

## 2022-04-27 DIAGNOSIS — N92.0 MENORRHAGIA WITH REGULAR CYCLE: ICD-10-CM

## 2022-04-27 DIAGNOSIS — G89.18 POSTOPERATIVE PAIN: Primary | ICD-10-CM

## 2022-04-27 LAB
HCG UR QL: NEGATIVE
INTERNAL QC OK POCT: NORMAL
POCT KIT EXPIRATION DATE: NORMAL
POCT KIT LOT NUMBER: NORMAL

## 2022-04-27 PROCEDURE — 58563 HYSTEROSCOPY ABLATION: CPT | Performed by: OBSTETRICS & GYNECOLOGY

## 2022-04-27 PROCEDURE — 81025 URINE PREGNANCY TEST: CPT | Performed by: OBSTETRICS & GYNECOLOGY

## 2022-04-27 RX ORDER — KETAMINE HYDROCHLORIDE 10 MG/ML
INJECTION INTRAMUSCULAR; INTRAVENOUS PRN
Status: DISCONTINUED | OUTPATIENT
Start: 2022-04-27 | End: 2022-04-27

## 2022-04-27 RX ORDER — DEXAMETHASONE SODIUM PHOSPHATE 4 MG/ML
INJECTION, SOLUTION INTRA-ARTICULAR; INTRALESIONAL; INTRAMUSCULAR; INTRAVENOUS; SOFT TISSUE PRN
Status: DISCONTINUED | OUTPATIENT
Start: 2022-04-27 | End: 2022-04-27

## 2022-04-27 RX ORDER — OXYCODONE HYDROCHLORIDE 5 MG/1
5 TABLET ORAL
Status: DISCONTINUED | OUTPATIENT
Start: 2022-04-27 | End: 2022-04-28 | Stop reason: HOSPADM

## 2022-04-27 RX ORDER — ACETAMINOPHEN 325 MG/1
975 TABLET ORAL ONCE
Status: DISCONTINUED | OUTPATIENT
Start: 2022-04-27 | End: 2022-04-28 | Stop reason: HOSPADM

## 2022-04-27 RX ORDER — FENTANYL CITRATE 50 UG/ML
INJECTION, SOLUTION INTRAMUSCULAR; INTRAVENOUS PRN
Status: DISCONTINUED | OUTPATIENT
Start: 2022-04-27 | End: 2022-04-27

## 2022-04-27 RX ORDER — KETOROLAC TROMETHAMINE 30 MG/ML
INJECTION, SOLUTION INTRAMUSCULAR; INTRAVENOUS PRN
Status: DISCONTINUED | OUTPATIENT
Start: 2022-04-27 | End: 2022-04-27

## 2022-04-27 RX ORDER — ONDANSETRON 4 MG/1
4 TABLET, ORALLY DISINTEGRATING ORAL EVERY 30 MIN PRN
Status: DISCONTINUED | OUTPATIENT
Start: 2022-04-27 | End: 2022-04-28 | Stop reason: HOSPADM

## 2022-04-27 RX ORDER — ONDANSETRON 2 MG/ML
4 INJECTION INTRAMUSCULAR; INTRAVENOUS EVERY 30 MIN PRN
Status: DISCONTINUED | OUTPATIENT
Start: 2022-04-27 | End: 2022-04-28 | Stop reason: HOSPADM

## 2022-04-27 RX ORDER — PROPOFOL 10 MG/ML
INJECTION, EMULSION INTRAVENOUS CONTINUOUS PRN
Status: DISCONTINUED | OUTPATIENT
Start: 2022-04-27 | End: 2022-04-27

## 2022-04-27 RX ORDER — SODIUM CHLORIDE, SODIUM LACTATE, POTASSIUM CHLORIDE, CALCIUM CHLORIDE 600; 310; 30; 20 MG/100ML; MG/100ML; MG/100ML; MG/100ML
INJECTION, SOLUTION INTRAVENOUS CONTINUOUS
Status: DISCONTINUED | OUTPATIENT
Start: 2022-04-27 | End: 2022-04-28 | Stop reason: HOSPADM

## 2022-04-27 RX ORDER — MEPERIDINE HYDROCHLORIDE 25 MG/ML
12.5 INJECTION INTRAMUSCULAR; INTRAVENOUS; SUBCUTANEOUS
Status: DISCONTINUED | OUTPATIENT
Start: 2022-04-27 | End: 2022-04-28 | Stop reason: HOSPADM

## 2022-04-27 RX ORDER — ONDANSETRON 2 MG/ML
INJECTION INTRAMUSCULAR; INTRAVENOUS PRN
Status: DISCONTINUED | OUTPATIENT
Start: 2022-04-27 | End: 2022-04-27

## 2022-04-27 RX ORDER — LIDOCAINE 40 MG/G
CREAM TOPICAL
Status: DISCONTINUED | OUTPATIENT
Start: 2022-04-27 | End: 2022-04-28 | Stop reason: HOSPADM

## 2022-04-27 RX ORDER — ACETAMINOPHEN 325 MG/1
975 TABLET ORAL EVERY 6 HOURS PRN
Qty: 50 TABLET | Refills: 0 | Status: SHIPPED | OUTPATIENT
Start: 2022-04-27

## 2022-04-27 RX ORDER — PROPOFOL 10 MG/ML
INJECTION, EMULSION INTRAVENOUS PRN
Status: DISCONTINUED | OUTPATIENT
Start: 2022-04-27 | End: 2022-04-27

## 2022-04-27 RX ORDER — IBUPROFEN 800 MG/1
800 TABLET, FILM COATED ORAL ONCE
Status: DISCONTINUED | OUTPATIENT
Start: 2022-04-27 | End: 2022-04-28 | Stop reason: HOSPADM

## 2022-04-27 RX ORDER — GLYCOPYRROLATE 0.2 MG/ML
INJECTION, SOLUTION INTRAMUSCULAR; INTRAVENOUS PRN
Status: DISCONTINUED | OUTPATIENT
Start: 2022-04-27 | End: 2022-04-27

## 2022-04-27 RX ORDER — HYDROMORPHONE HCL IN WATER/PF 6 MG/30 ML
0.2 PATIENT CONTROLLED ANALGESIA SYRINGE INTRAVENOUS EVERY 5 MIN PRN
Status: DISCONTINUED | OUTPATIENT
Start: 2022-04-27 | End: 2022-04-28 | Stop reason: HOSPADM

## 2022-04-27 RX ORDER — FENTANYL CITRATE 0.05 MG/ML
25 INJECTION, SOLUTION INTRAMUSCULAR; INTRAVENOUS
Status: DISCONTINUED | OUTPATIENT
Start: 2022-04-27 | End: 2022-04-28 | Stop reason: HOSPADM

## 2022-04-27 RX ORDER — LIDOCAINE HYDROCHLORIDE 20 MG/ML
INJECTION, SOLUTION INFILTRATION; PERINEURAL PRN
Status: DISCONTINUED | OUTPATIENT
Start: 2022-04-27 | End: 2022-04-27

## 2022-04-27 RX ORDER — IBUPROFEN 800 MG/1
800 TABLET, FILM COATED ORAL EVERY 6 HOURS PRN
Qty: 30 TABLET | Refills: 0 | Status: SHIPPED | OUTPATIENT
Start: 2022-04-27

## 2022-04-27 RX ORDER — LIDOCAINE HYDROCHLORIDE 10 MG/ML
INJECTION, SOLUTION EPIDURAL; INFILTRATION; INTRACAUDAL; PERINEURAL PRN
Status: DISCONTINUED | OUTPATIENT
Start: 2022-04-27 | End: 2022-04-27 | Stop reason: HOSPADM

## 2022-04-27 RX ORDER — FENTANYL CITRATE 0.05 MG/ML
25 INJECTION, SOLUTION INTRAMUSCULAR; INTRAVENOUS EVERY 5 MIN PRN
Status: DISCONTINUED | OUTPATIENT
Start: 2022-04-27 | End: 2022-04-28 | Stop reason: HOSPADM

## 2022-04-27 RX ORDER — OXYCODONE HYDROCHLORIDE 5 MG/1
5 TABLET ORAL EVERY 4 HOURS PRN
Status: DISCONTINUED | OUTPATIENT
Start: 2022-04-27 | End: 2022-04-28 | Stop reason: HOSPADM

## 2022-04-27 RX ADMIN — GLYCOPYRROLATE 0.2 MG: 0.2 INJECTION, SOLUTION INTRAMUSCULAR; INTRAVENOUS at 09:40

## 2022-04-27 RX ADMIN — PROPOFOL 120 MCG/KG/MIN: 10 INJECTION, EMULSION INTRAVENOUS at 09:41

## 2022-04-27 RX ADMIN — DEXAMETHASONE SODIUM PHOSPHATE 4 MG: 4 INJECTION, SOLUTION INTRA-ARTICULAR; INTRALESIONAL; INTRAMUSCULAR; INTRAVENOUS; SOFT TISSUE at 09:49

## 2022-04-27 RX ADMIN — KETOROLAC TROMETHAMINE 30 MG: 30 INJECTION, SOLUTION INTRAMUSCULAR; INTRAVENOUS at 10:01

## 2022-04-27 RX ADMIN — KETAMINE HYDROCHLORIDE 10 MG: 10 INJECTION INTRAMUSCULAR; INTRAVENOUS at 09:47

## 2022-04-27 RX ADMIN — PROPOFOL 40 MG: 10 INJECTION, EMULSION INTRAVENOUS at 09:44

## 2022-04-27 RX ADMIN — ONDANSETRON 4 MG: 2 INJECTION INTRAMUSCULAR; INTRAVENOUS at 09:45

## 2022-04-27 RX ADMIN — PROPOFOL 20 MG: 10 INJECTION, EMULSION INTRAVENOUS at 09:51

## 2022-04-27 RX ADMIN — SODIUM CHLORIDE, SODIUM LACTATE, POTASSIUM CHLORIDE, CALCIUM CHLORIDE: 600; 310; 30; 20 INJECTION, SOLUTION INTRAVENOUS at 09:11

## 2022-04-27 RX ADMIN — FENTANYL CITRATE 50 MCG: 50 INJECTION, SOLUTION INTRAMUSCULAR; INTRAVENOUS at 09:42

## 2022-04-27 RX ADMIN — LIDOCAINE HYDROCHLORIDE 3 ML: 20 INJECTION, SOLUTION INFILTRATION; PERINEURAL at 09:42

## 2022-04-27 NOTE — INTERVAL H&P NOTE
I have reviewed the surgical (or preoperative) H&P that is linked to this encounter, and examined the patient. There are no significant changes    Clinical Conditions Present on Arrival:  Clinically Significant Risk Factors Present on Admission                        
DISCHARGE

## 2022-04-27 NOTE — DISCHARGE INSTRUCTIONS
You received a dose of IV Toradol at 10am. Please do not take any additional Ibuprofen products (Aleve/Advil/Motrin/Naproxen/Celebrex) until after 4pm.

## 2022-04-27 NOTE — ANESTHESIA CARE TRANSFER NOTE
Patient: Johanna Tatum    Procedure: Procedure(s):  HYSTEROSCOPY, WITH ENDOMETRIAL RADIOFREQUENCY ABLATION - NOVASURE       Diagnosis: Menorrhagia with regular cycle [N92.0]  Diagnosis Additional Information: No value filed.    Anesthesia Type:   MAC     Note:    Oropharynx: oropharynx clear of all foreign objects  Level of Consciousness: drowsy  Oxygen Supplementation: face mask  Level of Supplemental Oxygen (L/min / FiO2): 8  Independent Airway: airway patency satisfactory and stable  Dentition: dentition unchanged  Vital Signs Stable: post-procedure vital signs reviewed and stable  Report to RN Given: handoff report given  Patient transferred to: Phase II    Handoff Report: Identifed the Patient, Identified the Reponsible Provider, Reviewed the pertinent medical history, Discussed the surgical course, Reviewed Intra-OP anesthesia mangement and issues during anesthesia, Set expectations for post-procedure period and Allowed opportunity for questions and acknowledgement of understanding      Vitals:  Vitals Value Taken Time   /69 04/27/22 1012   Temp 98.2  F (36.8  C) 04/27/22 1012   Pulse 76 04/27/22 1013   Resp 16 04/27/22 1012   SpO2 99 % 04/27/22 1013   Vitals shown include unvalidated device data.    Electronically Signed By: MARCO Webster CRNA  April 27, 2022  10:16 AM

## 2022-04-27 NOTE — ANESTHESIA POSTPROCEDURE EVALUATION
Patient: Johanna Tatum    Procedure: Procedure(s):  HYSTEROSCOPY, WITH ENDOMETRIAL RADIOFREQUENCY ABLATION - NOVASURE       Anesthesia Type:  MAC    Note:  Disposition: Outpatient   Postop Pain Control: Uneventful            Sign Out: Well controlled pain   PONV: No   Neuro/Psych: Uneventful            Sign Out: Acceptable/Baseline neuro status   Airway/Respiratory: Uneventful            Sign Out: Acceptable/Baseline resp. status   CV/Hemodynamics: Uneventful            Sign Out: Acceptable CV status; No obvious hypovolemia; No obvious fluid overload   Other NRE: NONE   DID A NON-ROUTINE EVENT OCCUR? No           Last vitals:  Vitals Value Taken Time   /81 04/27/22 1045   Temp 98.2  F (36.8  C) 04/27/22 1012   Pulse 60 04/27/22 1049   Resp 18 04/27/22 1045   SpO2 99 % 04/27/22 1049   Vitals shown include unvalidated device data.    Electronically Signed By: Festus Partida MD  April 27, 2022  10:52 AM

## 2022-04-27 NOTE — ANESTHESIA PREPROCEDURE EVALUATION
Anesthesia Pre-Procedure Evaluation    Patient: Johanna Tatum   MRN: [id number] : 1990        Procedure : Procedure(s):  HYSTEROSCOPY, WITH ENDOMETRIAL RADIOFREQUENCY ABLATION - NOVASURE          Past Medical History:   Diagnosis Date     Anemia      Asthma     sports induced, used albuterol     Seasonal allergies     hayfever      Past Surgical History:   Procedure Laterality Date     APPENDECTOMY  2002     C/SECTION, LOW TRANSVERSE  2013    arrest of descent      SECTION N/A 2018    Procedure: REPEAT  SECTION;  Surgeon: Dya Solorio MD;  Location: [facility] OR;  Service:      COMBINED AUGMENTATION MAMMAPLASTY AND ABDOMINOPLASTY Bilateral 3 years ago    x2      WISDOM TOOTH EXTRACTION  2008      Allergies   Allergen Reactions     Prednisone Rash      Social History     Tobacco Use     Smoking status: Never Smoker     Smokeless tobacco: Never Used   Substance Use Topics     Alcohol use: No     Comment: occationally      Wt Readings from Last 1 Encounters:   22 65.8 kg (145 lb)        Anesthesia Evaluation   Pt has had prior anesthetic.     No history of anesthetic complications       ROS/MED HX  ENT/Pulmonary:  - neg pulmonary ROS   (+) Intermittent, asthma Treatment: Inhaler prn,      Neurologic:  - neg neurologic ROS     Cardiovascular:  - neg cardiovascular ROS     METS/Exercise Tolerance:     Hematologic:  - neg hematologic  ROS     Musculoskeletal:  - neg musculoskeletal ROS     GI/Hepatic:  - neg GI/hepatic ROS     Renal/Genitourinary:  - neg Renal ROS     Endo:  - neg endo ROS     Psychiatric/Substance Use:  - neg psychiatric ROS     Infectious Disease:  - neg infectious disease ROS     Malignancy:  - neg malignancy ROS     Other:  - neg other ROS          Physical Exam    Airway        Mallampati: II   TM distance: > 3 FB   Neck ROM: full   Mouth opening: > 3 cm    Respiratory Devices and Support         Dental  no notable dental history      (+) caps      Cardiovascular   cardiovascular exam normal          Pulmonary   pulmonary exam normal                OUTSIDE LABS:  CBC:   Lab Results   Component Value Date    WBC 9.2 04/17/2018    HGB 9.3 (L) 11/08/2018    HGB 10.8 (L) 11/07/2018    HCT 37.7 04/17/2018     04/17/2018     BMP: No results found for: NA, POTASSIUM, CHLORIDE, CO2, BUN, CR, GLC  COAGS: No results found for: PTT, INR, FIBR  POC:   Lab Results   Component Value Date    HCG Negative 04/27/2022     HEPATIC: No results found for: ALBUMIN, PROTTOTAL, ALT, AST, GGT, ALKPHOS, BILITOTAL, BILIDIRECT, KARENA  OTHER:   Lab Results   Component Value Date    TSH 1.09 03/29/2022       Anesthesia Plan    ASA Status:  2   NPO Status:  NPO Appropriate    Anesthesia Type: MAC.     - Reason for MAC: immobility needed, straight local not clinically adequate   Induction: Propofol.   Maintenance: TIVA.        Consents    Anesthesia Plan(s) and associated risks, benefits, and realistic alternatives discussed. Questions answered and patient/representative(s) expressed understanding.    - Discussed:     - Discussed with:  Patient         Postoperative Care    Pain management: Multi-modal analgesia.   PONV prophylaxis: Ondansetron (or other 5HT-3), Dexamethasone or Solumedrol     Comments:    Other Comments: Toradol if OK with surgeon    Reviewed anesthetic options and risks. Patient agrees to proceed.     Recent Results (from the past 120 hour(s))  -Asymptomatic COVID-19 Virus (Coronavirus) by PCR Nose:   Collection Time: 04/23/22  1:40 PM  Specimen: Nose; Swab       Result                                            Value                         Ref Range                       SARS CoV2 PCR                                     Negative                      Negative, Testing sent t*  -hCG qual urine POCT:   Collection Time: 04/27/22  8:40 AM       Result                                            Value                         Ref Range                        HCG Qual Urine                                    Negative                      Negative                        Internal QC Check POCT                            Valid                         Valid                           POCT Kit Lot Number                               [id number]                                                        POCT Kit Expiration Date                          10/2023                                                            Festus Partida MD

## 2022-04-27 NOTE — OP NOTE
Endometrial Ablation    DATE OF SERVICE: 4/27/2022     PREOPERATIVE DIAGNOSIS: menorrhagia     POSTOPERATIVE DIAGNOSIS: same     PROCEDURE: hysteroscopy, endometrial ablation with Novasure      SURGEON:  Day Solorio      FINDINGS: normal endometrium     ANESTHESIA: local with sedation     ESTIMATED BLOOD LOSS: <10 cc     SPECIMENS: none      DRAINS: none     COMPLICATIONS: none     Brief History:  The patient is a 31 yo MWF  with worsening periods.  She has tried Mirena and NuvaRing without significant improvement.  After counseling on options, she decided she'd like to proceed with endometrial ablation.  Endometrial biopsy was normal.  She was counseled on the risks, benefits, and alternatives including new onset dysmenorrhea even if she doesn't have much period bleeding.     Procedure:  The patient was brought to the operating room where sedation medication was given.  After adequate anesthesia was obtained, she was placed in dorsal lithotomy position.  She was prepped and draped in the usual sterile fashion.  Time out was done confirming the patient and the procedure.  Bladder was emptied via straight catheter.  Speculum was inserted.  Ten cc of 1% lidocaine were infiltrated circumferentially around the cervix.  Skein's tenaculum was placed on the anterior lip of the cervix.  The cervix was atraumatically dilated to a number 7 Juliette dilator.  The cervical length was 3.5 cm.  The uterine length was 8 cm.  The cavity length was therefore 4.5 cm.  The hysteroscope was inserted and the uterine cavity was found to be normal.  Hysteroscope was removed.  Novasure device was then inserted and opened.  The cavity width was 3.5 cm.  Cavity assessment was performed and passed.  The device was enabled, and the actual ablation was begun.  Total time for the ablation was 102 sec with a power of 87.  The device was removed when the ablation was done.  The hysteroscope was again inserted with significant tissue  blanching noted.  All instrumentation was then removed from the patient's cervix and vagina.  She toleratd the procedure well.  She was returned to supine position and brought to the recovery room in stable condition.  Sponge and sharp counts were correct.  She is expected to go home on the day of the procedure.

## 2022-09-11 ENCOUNTER — HEALTH MAINTENANCE LETTER (OUTPATIENT)
Age: 32
End: 2022-09-11

## 2023-10-07 ENCOUNTER — HEALTH MAINTENANCE LETTER (OUTPATIENT)
Age: 33
End: 2023-10-07

## 2024-01-23 NOTE — PROGRESS NOTES
Assessment:     1.  IUD check  2.  Very short IUD strings  3.  Abnormal uterine bleeding/vaginal spotting likely secondary to Mirena IUD     Plan:   -Pelvic ultrasound ordered for IUD placement at  at 2 PM TODAY.  -Prescription for ibuprofen 600 mg 1 p.o. every 6 hours as needed #30, 3 refills.  -Discussed danger signs and symptoms of the IUD including how to check for strings.   -Instructed patient to check her strings monthly. Discussed when/where to call with any fever, severe back pain, severe abdominal pain, heavy bleeding (soaking more than 1 pad per hour).   -Also encouraged to call if she does not feel her strings.   -She was advised to use Ibuprofen as needed for mild to moderate pain.   -Mirena IUD should be removed by 1/16/2024.  -Return to clinic in 3 months or sooner as needed if ultrasound reveals Mirena IUD in good position today and symptoms persist.  -All questions answered.  Encouraged to call or return to clinic with any questions, concerns, or as needed.    TT with patient 25 mns >50% time spent in counseling or coordination of care.     Subjective:       Johanna Tatum is a 29 y.o. female who presents for IUD check. She had a Mirena inserted on 1/16/2019. Reports no complaints since insertion other than vaginal Bleeding which has been light but fairly constant.  Had a period lasting 11 days after Mirena IUD insertion followed by 1-1/2 weeks without vaginal spotting followed by daily light flow bright red vaginal spotting since without clots. Minimal cramping.  Blue Diamond x1 without pain.  Has not tried ibuprofen yet for vaginal spotting after Mirena IUD placement.  Denies fever, chills, lower abdominal pain or foul-smelling vaginal discharge.    Review of Systems  Pertinent items are noted in HPI.      Objective:     Physical Exam:  General: Pleasant, articulate, well-groomed, well-nourished female.  Not in any apparent distress.  Abdomen: Soft, nontender.  External genitalia: Normal hair  Urine testing complete.    distribution, no lesions.  Small amount of dark red blood at vaginal introitus.  Urethral opening: Without lesions or discharge. No tenderness.   Bladder: Without masses, or tenderness.  Vagina: Pink, rugated, small amount of dark red blood at the posterior fornix.  Cervix: nulliparous, pink, smooth, no lesions. IUD strings visualized and 2 mm in length after teasing from cervical canal with Cytobrush.   Bimanual: small, mobile, nontender, no masses.  Negative CMT.  Adnexa, without masses or tenderness.

## 2024-11-24 ENCOUNTER — HEALTH MAINTENANCE LETTER (OUTPATIENT)
Age: 34
End: 2024-11-24